# Patient Record
Sex: FEMALE | Race: WHITE | NOT HISPANIC OR LATINO
[De-identification: names, ages, dates, MRNs, and addresses within clinical notes are randomized per-mention and may not be internally consistent; named-entity substitution may affect disease eponyms.]

---

## 2017-02-15 ENCOUNTER — TRANSCRIPTION ENCOUNTER (OUTPATIENT)
Age: 67
End: 2017-02-15

## 2018-11-19 ENCOUNTER — MEDICATION RENEWAL (OUTPATIENT)
Age: 68
End: 2018-11-19

## 2019-01-13 ENCOUNTER — RX RENEWAL (OUTPATIENT)
Age: 69
End: 2019-01-13

## 2019-02-18 ENCOUNTER — RECORD ABSTRACTING (OUTPATIENT)
Age: 69
End: 2019-02-18

## 2019-02-18 DIAGNOSIS — M16.10 UNILATERAL PRIMARY OSTEOARTHRITIS, UNSPECIFIED HIP: ICD-10-CM

## 2019-02-18 DIAGNOSIS — L30.9 DERMATITIS, UNSPECIFIED: ICD-10-CM

## 2019-02-18 DIAGNOSIS — H81.10 BENIGN PAROXYSMAL VERTIGO, UNSPECIFIED EAR: ICD-10-CM

## 2019-02-18 DIAGNOSIS — R25.2 CRAMP AND SPASM: ICD-10-CM

## 2019-02-18 DIAGNOSIS — Z86.79 PERSONAL HISTORY OF OTHER DISEASES OF THE CIRCULATORY SYSTEM: ICD-10-CM

## 2019-02-18 DIAGNOSIS — Z82.49 FAMILY HISTORY OF ISCHEMIC HEART DISEASE AND OTHER DISEASES OF THE CIRCULATORY SYSTEM: ICD-10-CM

## 2019-02-18 DIAGNOSIS — Z87.898 PERSONAL HISTORY OF OTHER SPECIFIED CONDITIONS: ICD-10-CM

## 2019-02-18 DIAGNOSIS — Z83.438 FAMILY HISTORY OF OTHER DISORDER OF LIPOPROTEIN METABOLISM AND OTHER LIPIDEMIA: ICD-10-CM

## 2019-02-18 DIAGNOSIS — Z79.899 OTHER LONG TERM (CURRENT) DRUG THERAPY: ICD-10-CM

## 2019-02-18 RX ORDER — MULTIVIT,IRON,MINERALS/LUTEIN
TABLET ORAL DAILY
Refills: 0 | Status: ACTIVE | COMMUNITY

## 2019-02-18 RX ORDER — ASCORBIC ACID 500 MG
500 TABLET ORAL DAILY
Refills: 0 | Status: ACTIVE | COMMUNITY

## 2019-02-19 ENCOUNTER — APPOINTMENT (OUTPATIENT)
Dept: INTERNAL MEDICINE | Facility: CLINIC | Age: 69
End: 2019-02-19
Payer: MEDICARE

## 2019-02-19 ENCOUNTER — NON-APPOINTMENT (OUTPATIENT)
Age: 69
End: 2019-02-19

## 2019-02-19 VITALS
HEART RATE: 70 BPM | HEIGHT: 65 IN | BODY MASS INDEX: 29.66 KG/M2 | WEIGHT: 178 LBS | SYSTOLIC BLOOD PRESSURE: 143 MMHG | DIASTOLIC BLOOD PRESSURE: 82 MMHG

## 2019-02-19 VITALS — SYSTOLIC BLOOD PRESSURE: 134 MMHG | DIASTOLIC BLOOD PRESSURE: 82 MMHG

## 2019-02-19 DIAGNOSIS — Z78.9 OTHER SPECIFIED HEALTH STATUS: ICD-10-CM

## 2019-02-19 DIAGNOSIS — Z80.0 FAMILY HISTORY OF MALIGNANT NEOPLASM OF DIGESTIVE ORGANS: ICD-10-CM

## 2019-02-19 DIAGNOSIS — Z80.42 FAMILY HISTORY OF MALIGNANT NEOPLASM OF PROSTATE: ICD-10-CM

## 2019-02-19 DIAGNOSIS — E66.3 OVERWEIGHT: ICD-10-CM

## 2019-02-19 DIAGNOSIS — E66.9 OBESITY, UNSPECIFIED: ICD-10-CM

## 2019-02-19 LAB
BILIRUB UR QL STRIP: NEGATIVE
CLARITY UR: CLEAR
COLLECTION METHOD: NORMAL
GLUCOSE UR-MCNC: NEGATIVE
HCG UR QL: 0.2 EU/DL
HGB UR QL STRIP.AUTO: NEGATIVE
KETONES UR-MCNC: NEGATIVE
LEUKOCYTE ESTERASE UR QL STRIP: NEGATIVE
NITRITE UR QL STRIP: NEGATIVE
PH UR STRIP: 7
PROT UR STRIP-MCNC: NEGATIVE
SP GR UR STRIP: 1.01

## 2019-02-19 PROCEDURE — G0438: CPT

## 2019-02-19 PROCEDURE — 36415 COLL VENOUS BLD VENIPUNCTURE: CPT

## 2019-02-19 PROCEDURE — 81003 URINALYSIS AUTO W/O SCOPE: CPT | Mod: QW

## 2019-02-19 PROCEDURE — 93000 ELECTROCARDIOGRAM COMPLETE: CPT

## 2019-02-19 NOTE — HEALTH RISK ASSESSMENT
[Good] : ~his/her~  mood as  good [No falls in past year] : Patient reported no falls in the past year [0] : 2) Feeling down, depressed, or hopeless: Not at all (0) [Patient reported mammogram was normal] : Patient reported mammogram was normal [Patient reported PAP Smear was normal] : Patient reported PAP Smear was normal [Patient reported colonoscopy was normal] : Patient reported colonoscopy was normal [None] : None [With Family] : lives with family [] :  [Fully functional (bathing, dressing, toileting, transferring, walking, feeding)] : Fully functional (bathing, dressing, toileting, transferring, walking, feeding) [Fully functional (using the telephone, shopping, preparing meals, housekeeping, doing laundry, using] : Fully functional and needs no help or supervision to perform IADLs (using the telephone, shopping, preparing meals, housekeeping, doing laundry, using transportation, managing medications and managing finances) [With Patient/Caregiver] : With Patient/Caregiver [Designated Healthcare Proxy] : Designated healthcare proxy [I will adhere to the patient's wishes as expressed in the advance directive except as noted below.] : I will adhere to the patient's wishes as expressed in the advance directive except as noted below [] : No [[id number]] : 0 [Change in mental status noted] : No change in mental status noted [Reports changes in hearing] : Reports no changes in hearing [Reports changes in vision] : Reports no changes in vision [Reports changes in dental health] : Reports no changes in dental health [MammogramDate] : 07/18/2018 [PapSmearDate] : April 2018 [ColonoscopyDate] : 11/27/2018 [HepatitisCDate] : 02/02/2016 [AdvancecareDate] : 02/19/2019

## 2019-02-19 NOTE — HISTORY OF PRESENT ILLNESS
[FreeTextEntry1] : CPE [de-identified] : The patient is being seen for a health maintenance evaluation.\par Exercise: The patient is not exercising\par Diet: No formal diet at present, had been on Nutrasystem but stopped because of expense, bored with the food and didn't see further weight loss\par Dental: Has not had dental exam  for 5 years\par Hearing: normal moat of the time\par Vision. Glasses: full-time\par             Checks: October 2018\par Patient takes BP at home and has been in the range of 118-132/61-74\par

## 2019-02-19 NOTE — PHYSICAL EXAM
[No Acute Distress] : no acute distress [Well Nourished] : well nourished [Well Developed] : well developed [Well-Appearing] : well-appearing [Normal Sclera/Conjunctiva] : normal sclera/conjunctiva [PERRL] : pupils equal round and reactive to light [EOMI] : extraocular movements intact [Normal Outer Ear/Nose] : the outer ears and nose were normal in appearance [Normal Oropharynx] : the oropharynx was normal [Normal TMs] : both tympanic membranes were normal [No JVD] : no jugular venous distention [Supple] : supple [No Lymphadenopathy] : no lymphadenopathy [Thyroid Normal, No Nodules] : the thyroid was normal and there were no nodules present [No Respiratory Distress] : no respiratory distress  [Clear to Auscultation] : lungs were clear to auscultation bilaterally [No Accessory Muscle Use] : no accessory muscle use [Normal Percussion] : the chest was normal to percussion [Normal Rate] : normal rate  [Regular Rhythm] : with a regular rhythm [Normal S1, S2] : normal S1 and S2 [No Murmur] : no murmur heard [No Carotid Bruits] : no carotid bruits [No Abdominal Bruit] : a ~M bruit was not heard ~T in the abdomen [No Varicosities] : no varicosities [Pedal Pulses Present] : the pedal pulses are present [No Edema] : there was no peripheral edema [No Extremity Clubbing/Cyanosis] : no extremity clubbing/cyanosis [No Palpable Aorta] : no palpable aorta [Normal Appearance] : normal in appearance [No Nipple Discharge] : no nipple discharge [Soft] : abdomen soft [Non Tender] : non-tender [Non-distended] : non-distended [No Masses] : no abdominal mass palpated [No HSM] : no HSM [Normal Bowel Sounds] : normal bowel sounds [Normal Supraclavicular Nodes] : no supraclavicular lymphadenopathy [Normal Axillary Nodes] : no axillary lymphadenopathy [Normal Posterior Cervical Nodes] : no posterior cervical lymphadenopathy [Normal Anterior Cervical Nodes] : no anterior cervical lymphadenopathy [No CVA Tenderness] : no CVA  tenderness [No Spinal Tenderness] : no spinal tenderness [No Joint Swelling] : no joint swelling [Grossly Normal Strength/Tone] : grossly normal strength/tone [No Rash] : no rash [No Skin Lesions] : no skin lesions [Normal Gait] : normal gait [Coordination Grossly Intact] : coordination grossly intact [No Focal Deficits] : no focal deficits [Deep Tendon Reflexes (DTR)] : deep tendon reflexes were 2+ and symmetric [Speech Grossly Normal] : speech grossly normal [Memory Grossly Normal] : memory grossly normal [Normal Affect] : the affect was normal [Alert and Oriented x3] : oriented to person, place, and time [Normal Mood] : the mood was normal [Normal Insight/Judgement] : insight and judgment were intact [Kyphosis] : no kyphosis [de-identified] : teeth-plaque [de-identified] : bruise on left anterior thigh, resolving

## 2019-02-19 NOTE — REVIEW OF SYSTEMS
[Dyspnea on Exertion] : dyspnea on exertion [Nocturia] : nocturia [Easy Bruising] : easy bruising [Negative] : ENT [Fever] : no fever [Chills] : no chills [Fatigue] : no fatigue [Hot Flashes] : no hot flashes [Night Sweats] : no night sweats [Chest Pain] : no chest pain [Palpitations] : no palpitations [Leg Claudication] : no leg claudication [Lower Ext Edema] : no lower extremity edema [Orthopnea] : no orthopnea [Wheezing] : no wheezing [Cough] : no cough [Nausea] : no nausea [Constipation] : no constipation [Diarrhea] : diarrhea [Vomiting] : no vomiting [Incontinence] : no incontinence [Frequency] : no frequency [Joint Pain] : no joint pain [Joint Stiffness] : no joint stiffness [Muscle Pain] : no muscle pain [Back Pain] : no back pain [Itching] : no itching [Mole Changes] : no mole changes [Skin Rash] : no skin rash [Headache] : no headache [Dizziness] : no dizziness [Fainting] : no fainting [Memory Loss] : no memory loss [Insomnia] : no insomnia [Anxiety] : no anxiety [Depression] : no depression [Easy Bleeding] : no easy bleeding [Swollen Glands] : no swollen glands [FreeTextEntry6] : can walk 2 flights [FreeTextEntry8] : nocturia 1x/night [de-identified] : eczema on left heel

## 2019-02-20 LAB
ALBUMIN SERPL ELPH-MCNC: 4.7 G/DL
ALP BLD-CCNC: 88 U/L
ALT SERPL-CCNC: 25 U/L
ANION GAP SERPL CALC-SCNC: 13 MMOL/L
AST SERPL-CCNC: 17 U/L
BASOPHILS # BLD AUTO: 0.05 K/UL
BASOPHILS NFR BLD AUTO: 0.7 %
BILIRUB SERPL-MCNC: 0.5 MG/DL
BUN SERPL-MCNC: 18 MG/DL
CALCIUM SERPL-MCNC: 9.9 MG/DL
CHLORIDE SERPL-SCNC: 101 MMOL/L
CHOLEST SERPL-MCNC: 175 MG/DL
CHOLEST/HDLC SERPL: 2.5 RATIO
CK SERPL-CCNC: 78 U/L
CO2 SERPL-SCNC: 27 MMOL/L
CREAT SERPL-MCNC: 0.71 MG/DL
EOSINOPHIL # BLD AUTO: 0.25 K/UL
EOSINOPHIL NFR BLD AUTO: 3.4 %
GLUCOSE SERPL-MCNC: 86 MG/DL
HCT VFR BLD CALC: 49.3 %
HDLC SERPL-MCNC: 70 MG/DL
HGB BLD-MCNC: 15.7 G/DL
IMM GRANULOCYTES NFR BLD AUTO: 0.1 %
LDLC SERPL CALC-MCNC: 73 MG/DL
LYMPHOCYTES # BLD AUTO: 2.69 K/UL
LYMPHOCYTES NFR BLD AUTO: 37 %
MAN DIFF?: NORMAL
MCHC RBC-ENTMCNC: 29.6 PG
MCHC RBC-ENTMCNC: 31.8 GM/DL
MCV RBC AUTO: 92.8 FL
MONOCYTES # BLD AUTO: 0.59 K/UL
MONOCYTES NFR BLD AUTO: 8.1 %
NEUTROPHILS # BLD AUTO: 3.68 K/UL
NEUTROPHILS NFR BLD AUTO: 50.7 %
PLATELET # BLD AUTO: 249 K/UL
POTASSIUM SERPL-SCNC: 4.9 MMOL/L
PROT SERPL-MCNC: 7.1 G/DL
RBC # BLD: 5.31 M/UL
RBC # FLD: 13.4 %
SODIUM SERPL-SCNC: 141 MMOL/L
TRIGL SERPL-MCNC: 162 MG/DL
WBC # FLD AUTO: 7.27 K/UL

## 2019-03-03 ENCOUNTER — TRANSCRIPTION ENCOUNTER (OUTPATIENT)
Age: 69
End: 2019-03-03

## 2019-03-13 DIAGNOSIS — M81.0 AGE-RELATED OSTEOPOROSIS W/OUT CURRENT PATHOLOGICAL FRACTURE: ICD-10-CM

## 2019-04-18 ENCOUNTER — RX RENEWAL (OUTPATIENT)
Age: 69
End: 2019-04-18

## 2019-06-04 ENCOUNTER — APPOINTMENT (OUTPATIENT)
Dept: INTERNAL MEDICINE | Facility: CLINIC | Age: 69
End: 2019-06-04
Payer: MEDICARE

## 2019-06-04 VITALS — DIASTOLIC BLOOD PRESSURE: 76 MMHG | SYSTOLIC BLOOD PRESSURE: 132 MMHG

## 2019-06-04 VITALS
HEART RATE: 72 BPM | BODY MASS INDEX: 30.45 KG/M2 | WEIGHT: 183 LBS | DIASTOLIC BLOOD PRESSURE: 80 MMHG | SYSTOLIC BLOOD PRESSURE: 144 MMHG

## 2019-06-04 PROCEDURE — 99213 OFFICE O/P EST LOW 20 MIN: CPT

## 2019-06-04 NOTE — PLAN
[FreeTextEntry1] : Podiatry evaluation re othotic.]\par Advil for thumb and heel but only occasionally b/o problem of HTN.\par No change in meds.\par Blood work in late September and CPE again in Feb 2020.

## 2019-06-04 NOTE — PHYSICAL EXAM
[No Acute Distress] : no acute distress [No JVD] : no jugular venous distention [Supple] : supple [No Respiratory Distress] : no respiratory distress  [Clear to Auscultation] : lungs were clear to auscultation bilaterally [Normal Rate] : normal rate  [Regular Rhythm] : with a regular rhythm [Normal S1, S2] : normal S1 and S2 [Pedal Pulses Present] : the pedal pulses are present [No Edema] : there was no peripheral edema [de-identified] : obese

## 2019-06-04 NOTE — HISTORY OF PRESENT ILLNESS
[FreeTextEntry1] : follow up [de-identified] : Has checked BP at home a few times 2 out of 3 are very good and June 4 144/75, similar to office BP today. SOB , chest pain, pressure none. Palpitations none.\par Taking all meds regularly. No muscle pain dizziness or lightheadedness. \par Not on any diet. Pain in base of right  thumb. Not  related to weather Pain in right heel and arch at times. Has it with sneakers too. Has not seen podiatrist.

## 2019-07-03 ENCOUNTER — RX RENEWAL (OUTPATIENT)
Age: 69
End: 2019-07-03

## 2019-08-28 ENCOUNTER — RX RENEWAL (OUTPATIENT)
Age: 69
End: 2019-08-28

## 2019-09-23 ENCOUNTER — RX RENEWAL (OUTPATIENT)
Age: 69
End: 2019-09-23

## 2019-10-08 ENCOUNTER — APPOINTMENT (OUTPATIENT)
Dept: INTERNAL MEDICINE | Facility: CLINIC | Age: 69
End: 2019-10-08

## 2019-10-10 ENCOUNTER — APPOINTMENT (OUTPATIENT)
Dept: INTERNAL MEDICINE | Facility: CLINIC | Age: 69
End: 2019-10-10
Payer: MEDICARE

## 2019-10-10 VITALS
HEART RATE: 74 BPM | WEIGHT: 185 LBS | HEIGHT: 65 IN | SYSTOLIC BLOOD PRESSURE: 140 MMHG | BODY MASS INDEX: 30.82 KG/M2 | DIASTOLIC BLOOD PRESSURE: 84 MMHG

## 2019-10-10 VITALS — SYSTOLIC BLOOD PRESSURE: 140 MMHG | DIASTOLIC BLOOD PRESSURE: 78 MMHG

## 2019-10-10 PROCEDURE — G0008: CPT

## 2019-10-10 PROCEDURE — 99213 OFFICE O/P EST LOW 20 MIN: CPT | Mod: 25

## 2019-10-10 PROCEDURE — 90662 IIV NO PRSV INCREASED AG IM: CPT

## 2019-10-10 PROCEDURE — 36415 COLL VENOUS BLD VENIPUNCTURE: CPT

## 2019-10-10 RX ORDER — MULTIVIT-MIN/FOLIC/VIT K/LYCOP 400-300MCG
25 MCG TABLET ORAL DAILY
Qty: 90 | Refills: 0 | Status: ACTIVE | COMMUNITY

## 2019-10-10 NOTE — HISTORY OF PRESENT ILLNESS
[FreeTextEntry1] : follow up [de-identified] : Has checked BP at home several times and overall are very good: 118--126 systolic. SOB , chest pain, pressure, Palpitations none.\par Taking all meds regularly. No muscle pain, dizziness or lightheadedness. \par Not on any diet. Weight unchanged.\par Pain in base of right  thumb persists. Now suggests that it may be related to weather Pain in right heel and arch has disappeared. \par Appetite good. \par Exercise --trying to walk but only a few blocks.

## 2019-10-10 NOTE — PHYSICAL EXAM
[No Acute Distress] : no acute distress [No Lymphadenopathy] : no lymphadenopathy [No JVD] : no jugular venous distention [No Respiratory Distress] : no respiratory distress  [No Accessory Muscle Use] : no accessory muscle use [Clear to Auscultation] : lungs were clear to auscultation bilaterally [Normal Percussion] : the chest was normal to percussion [Normal Rate] : normal rate  [Normal S1, S2] : normal S1 and S2 [Regular Rhythm] : with a regular rhythm [No Edema] : there was no peripheral edema [de-identified] : obese

## 2019-10-11 LAB
25(OH)D3 SERPL-MCNC: 52.7 NG/ML
ALBUMIN SERPL ELPH-MCNC: 4.8 G/DL
ALP BLD-CCNC: 80 U/L
ALT SERPL-CCNC: 17 U/L
ANION GAP SERPL CALC-SCNC: 19 MMOL/L
AST SERPL-CCNC: 14 U/L
BILIRUB SERPL-MCNC: 0.4 MG/DL
BUN SERPL-MCNC: 17 MG/DL
CALCIUM SERPL-MCNC: 9.9 MG/DL
CHLORIDE SERPL-SCNC: 100 MMOL/L
CHOLEST SERPL-MCNC: 151 MG/DL
CHOLEST/HDLC SERPL: 2.8 RATIO
CK SERPL-CCNC: 85 U/L
CO2 SERPL-SCNC: 23 MMOL/L
CREAT SERPL-MCNC: 0.71 MG/DL
GLUCOSE SERPL-MCNC: 87 MG/DL
HDLC SERPL-MCNC: 55 MG/DL
LDLC SERPL CALC-MCNC: 58 MG/DL
POTASSIUM SERPL-SCNC: 3.7 MMOL/L
PROT SERPL-MCNC: 6.9 G/DL
SODIUM SERPL-SCNC: 142 MMOL/L
TRIGL SERPL-MCNC: 188 MG/DL

## 2019-10-23 ENCOUNTER — TRANSCRIPTION ENCOUNTER (OUTPATIENT)
Age: 69
End: 2019-10-23

## 2019-11-21 ENCOUNTER — RX RENEWAL (OUTPATIENT)
Age: 69
End: 2019-11-21

## 2019-12-10 ENCOUNTER — APPOINTMENT (OUTPATIENT)
Dept: INTERNAL MEDICINE | Facility: CLINIC | Age: 69
End: 2019-12-10
Payer: MEDICARE

## 2019-12-10 VITALS — SYSTOLIC BLOOD PRESSURE: 134 MMHG | DIASTOLIC BLOOD PRESSURE: 78 MMHG

## 2019-12-10 VITALS
DIASTOLIC BLOOD PRESSURE: 82 MMHG | HEART RATE: 70 BPM | SYSTOLIC BLOOD PRESSURE: 140 MMHG | BODY MASS INDEX: 29.99 KG/M2 | WEIGHT: 180 LBS | HEIGHT: 65 IN

## 2019-12-10 PROCEDURE — 99213 OFFICE O/P EST LOW 20 MIN: CPT

## 2019-12-10 NOTE — HISTORY OF PRESENT ILLNESS
[FreeTextEntry1] : follow up [de-identified] : Has checked BP at home several times and overall are very good: 118--126 systolic. SOB , chest pain, pressure, Palpitations none.\par Taking all meds regularly. No muscle pain, dizziness or lightheadedness. \par Not on any diet. Weight unchanged.\par Pain in base of right  thumb persists. Now suggests that it may be related to weather Pain in right heel and arch has disappeared. \par Appetite good. \par Exercise --trying to walk but only a few blocks.

## 2019-12-10 NOTE — PHYSICAL EXAM
[No Acute Distress] : no acute distress [Normal Voice/Communication] : normal voice/communication [No JVD] : no jugular venous distention [No Lymphadenopathy] : no lymphadenopathy [No Respiratory Distress] : no respiratory distress  [No Accessory Muscle Use] : no accessory muscle use [Regular Rhythm] : with a regular rhythm [Clear to Auscultation] : lungs were clear to auscultation bilaterally [Normal Rate] : normal rate  [Normal S1, S2] : normal S1 and S2 [No Murmur] : no murmur heard [Normal Mood] : the mood was normal [Speech Grossly Normal] : speech grossly normal

## 2019-12-11 ENCOUNTER — RX RENEWAL (OUTPATIENT)
Age: 69
End: 2019-12-11

## 2020-01-26 ENCOUNTER — RX RENEWAL (OUTPATIENT)
Age: 70
End: 2020-01-26

## 2020-02-05 ENCOUNTER — NON-APPOINTMENT (OUTPATIENT)
Age: 70
End: 2020-02-05

## 2020-02-05 ENCOUNTER — APPOINTMENT (OUTPATIENT)
Dept: INTERNAL MEDICINE | Facility: CLINIC | Age: 70
End: 2020-02-05
Payer: MEDICARE

## 2020-02-05 VITALS
WEIGHT: 180 LBS | DIASTOLIC BLOOD PRESSURE: 70 MMHG | HEIGHT: 65 IN | HEART RATE: 71 BPM | BODY MASS INDEX: 29.99 KG/M2 | SYSTOLIC BLOOD PRESSURE: 130 MMHG

## 2020-02-05 PROCEDURE — 99214 OFFICE O/P EST MOD 30 MIN: CPT | Mod: 25

## 2020-02-05 PROCEDURE — 93000 ELECTROCARDIOGRAM COMPLETE: CPT

## 2020-02-05 NOTE — REVIEW OF SYSTEMS
[Fatigue] : fatigue [Recent Change In Weight] : ~T recent weight change [Negative] : Heme/Lymph [Fever] : no fever [Hot Flashes] : no hot flashes [Chills] : no chills [Night Sweats] : no night sweats [Nausea] : no nausea [Constipation] : no constipation [Vomiting] : no vomiting [Diarrhea] : diarrhea [Joint Pain] : no joint pain [Muscle Pain] : no muscle pain [Itching] : no itching [Skin Rash] : no skin rash

## 2020-02-05 NOTE — CONSULT LETTER
[Dear  ___] : Dear  [unfilled], [Consult Closing:] : Thank you very much for allowing me to participate in the care of this patient.  If you have any questions, please do not hesitate to contact me. [Please see my note below.] : Please see my note below. [Consult Letter:] : I had the pleasure of evaluating your patient, [unfilled]. [FreeTextEntry3] : Miriam Myers MD [facility] [Sincerely,] : Sincerely,

## 2020-02-05 NOTE — ASSESSMENT
[No Further Testing Recommended] : no further testing recommended [Patient Optimized for Surgery] : Patient optimized for surgery [Continue anti-platelet treatment as is] : Continue current anti-platelet treatment

## 2020-02-05 NOTE — HISTORY OF PRESENT ILLNESS
[No Pertinent Pulmonary History] : no history of asthma, COPD, sleep apnea, or smoking [No Pertinent Cardiac History] : no history of aortic stenosis, atrial fibrillation, coronary artery disease, recent myocardial infarction, or implantable device/pacemaker [No Adverse Anesthesia Reaction] : no adverse anesthesia reaction in self or family member [Good (7-10 METs)] : Good (7-10 METs) [(Patient denies any chest pain, claudication, dyspnea on exertion, orthopnea, palpitations or syncope)] : Patient denies any chest pain, claudication, dyspnea on exertion, orthopnea, palpitations or syncope [Anti-Platelet Agents: _____] : Anti-Platelet Agents: [unfilled] [Chronic Anticoagulation] : no chronic anticoagulation [Chronic Kidney Disease] : no chronic kidney disease [FreeTextEntry1] : cataract extraction right eye with IOL [Diabetes] : no diabetes [FreeTextEntry3] : Dru Pittman MD [FreeTextEntry2] : Feb 19, 2020 [FreeTextEntry4] : Going for right cataract extraction. \par Otherwise feels OK.  [FreeTextEntry5] : Had catheter ablation of SVT  in 2012

## 2020-02-05 NOTE — PHYSICAL EXAM
[No Acute Distress] : no acute distress [No JVD] : no jugular venous distention [No Lymphadenopathy] : no lymphadenopathy [Supple] : supple [Thyroid Normal, No Nodules] : the thyroid was normal and there were no nodules present [No Respiratory Distress] : no respiratory distress  [No Accessory Muscle Use] : no accessory muscle use [Clear to Auscultation] : lungs were clear to auscultation bilaterally [Normal Percussion] : the chest was normal to percussion [Normal Rate] : normal rate  [Regular Rhythm] : with a regular rhythm [Normal S1, S2] : normal S1 and S2 [No Murmur] : no murmur heard [No Abdominal Bruit] : a ~M bruit was not heard ~T in the abdomen [No Carotid Bruits] : no carotid bruits [Non Tender] : non-tender [No Edema] : there was no peripheral edema [Soft] : abdomen soft [Non-distended] : non-distended [Normal Bowel Sounds] : normal bowel sounds [No HSM] : no HSM [No Rash] : no rash [Normal Supraclavicular Nodes] : no supraclavicular lymphadenopathy [Well Nourished] : well nourished [Normal Voice/Communication] : normal voice/communication [Normal Sclera/Conjunctiva] : normal sclera/conjunctiva [No Palpable Aorta] : no palpable aorta

## 2020-02-06 LAB
ALBUMIN SERPL ELPH-MCNC: 4.8 G/DL
ALP BLD-CCNC: 91 U/L
ALT SERPL-CCNC: 22 U/L
ANION GAP SERPL CALC-SCNC: 13 MMOL/L
AST SERPL-CCNC: 14 U/L
BASOPHILS # BLD AUTO: 0.04 K/UL
BASOPHILS NFR BLD AUTO: 0.5 %
BILIRUB SERPL-MCNC: 0.5 MG/DL
BUN SERPL-MCNC: 19 MG/DL
CALCIUM SERPL-MCNC: 10.1 MG/DL
CHLORIDE SERPL-SCNC: 99 MMOL/L
CHOLEST SERPL-MCNC: 168 MG/DL
CHOLEST/HDLC SERPL: 2.7 RATIO
CO2 SERPL-SCNC: 29 MMOL/L
CREAT SERPL-MCNC: 0.78 MG/DL
EOSINOPHIL # BLD AUTO: 0.25 K/UL
EOSINOPHIL NFR BLD AUTO: 3 %
GLUCOSE SERPL-MCNC: 86 MG/DL
HCT VFR BLD CALC: 48.6 %
HDLC SERPL-MCNC: 62 MG/DL
HGB BLD-MCNC: 15.7 G/DL
IMM GRANULOCYTES NFR BLD AUTO: 0.4 %
LDLC SERPL CALC-MCNC: 66 MG/DL
LYMPHOCYTES # BLD AUTO: 2.84 K/UL
LYMPHOCYTES NFR BLD AUTO: 34.2 %
MAN DIFF?: NORMAL
MCHC RBC-ENTMCNC: 29.9 PG
MCHC RBC-ENTMCNC: 32.3 GM/DL
MCV RBC AUTO: 92.6 FL
MONOCYTES # BLD AUTO: 0.67 K/UL
MONOCYTES NFR BLD AUTO: 8.1 %
NEUTROPHILS # BLD AUTO: 4.48 K/UL
NEUTROPHILS NFR BLD AUTO: 53.8 %
PLATELET # BLD AUTO: 263 K/UL
POTASSIUM SERPL-SCNC: 4.2 MMOL/L
PROT SERPL-MCNC: 7 G/DL
RBC # BLD: 5.25 M/UL
RBC # FLD: 13.7 %
SODIUM SERPL-SCNC: 142 MMOL/L
TRIGL SERPL-MCNC: 199 MG/DL
WBC # FLD AUTO: 8.31 K/UL

## 2020-02-24 ENCOUNTER — RX RENEWAL (OUTPATIENT)
Age: 70
End: 2020-02-24

## 2020-02-27 ENCOUNTER — APPOINTMENT (OUTPATIENT)
Dept: INTERNAL MEDICINE | Facility: CLINIC | Age: 70
End: 2020-02-27
Payer: MEDICARE

## 2020-02-27 VITALS
HEART RATE: 70 BPM | BODY MASS INDEX: 29.99 KG/M2 | WEIGHT: 180 LBS | SYSTOLIC BLOOD PRESSURE: 141 MMHG | HEIGHT: 65 IN | DIASTOLIC BLOOD PRESSURE: 80 MMHG

## 2020-02-27 VITALS — SYSTOLIC BLOOD PRESSURE: 126 MMHG | DIASTOLIC BLOOD PRESSURE: 76 MMHG

## 2020-02-27 DIAGNOSIS — H26.9 UNSPECIFIED CATARACT: ICD-10-CM

## 2020-02-27 PROCEDURE — G0442 ANNUAL ALCOHOL SCREEN 15 MIN: CPT | Mod: 59

## 2020-02-27 PROCEDURE — G0439: CPT

## 2020-02-27 NOTE — PHYSICAL EXAM
[No Acute Distress] : no acute distress [Well Nourished] : well nourished [Well Developed] : well developed [Normal Sclera/Conjunctiva] : normal sclera/conjunctiva [Well-Appearing] : well-appearing [Normal Voice/Communication] : normal voice/communication [EOMI] : extraocular movements intact [Normal Outer Ear/Nose] : the outer ears and nose were normal in appearance [Normal TMs] : both tympanic membranes were normal [Normal Oropharynx] : the oropharynx was normal [No JVD] : no jugular venous distention [Supple] : supple [No Lymphadenopathy] : no lymphadenopathy [No Accessory Muscle Use] : no accessory muscle use [No Respiratory Distress] : no respiratory distress  [Thyroid Normal, No Nodules] : the thyroid was normal and there were no nodules present [Normal Rate] : normal rate  [Regular Rhythm] : with a regular rhythm [Clear to Auscultation] : lungs were clear to auscultation bilaterally [Normal S1, S2] : normal S1 and S2 [No Abdominal Bruit] : a ~M bruit was not heard ~T in the abdomen [No Carotid Bruits] : no carotid bruits [No Murmur] : no murmur heard [No Varicosities] : no varicosities [No Edema] : there was no peripheral edema [Pedal Pulses Present] : the pedal pulses are present [No Palpable Aorta] : no palpable aorta [No Extremity Clubbing/Cyanosis] : no extremity clubbing/cyanosis [Normal Appearance] : normal in appearance [Soft] : abdomen soft [No Nipple Discharge] : no nipple discharge [No Axillary Lymphadenopathy] : no axillary lymphadenopathy [Non Tender] : non-tender [Non-distended] : non-distended [No HSM] : no HSM [No Masses] : no abdominal mass palpated [Normal Bowel Sounds] : normal bowel sounds [Normal Supraclavicular Nodes] : no supraclavicular lymphadenopathy [Normal Axillary Nodes] : no axillary lymphadenopathy [Normal Posterior Cervical Nodes] : no posterior cervical lymphadenopathy [Normal Femoral Nodes] : no femoral lymphadenopathy [Normal Anterior Cervical Nodes] : no anterior cervical lymphadenopathy [No CVA Tenderness] : no CVA  tenderness [No Spinal Tenderness] : no spinal tenderness [Grossly Normal Strength/Tone] : grossly normal strength/tone [No Joint Swelling] : no joint swelling [No Rash] : no rash [Coordination Grossly Intact] : coordination grossly intact [No Skin Lesions] : no skin lesions [Normal Gait] : normal gait [No Focal Deficits] : no focal deficits [Speech Grossly Normal] : speech grossly normal [Memory Grossly Normal] : memory grossly normal [Normal Affect] : the affect was normal [Alert and Oriented x3] : oriented to person, place, and time [Normal Mood] : the mood was normal [Normal Insight/Judgement] : insight and judgment were intact [Right Foot Was Examined] : Right foot ~C was examined [Left Foot Was Examined] : left foot ~C was examined [None] : no ulcers in either foot were found [] : with full ROM [Kyphosis] : no kyphosis [Scoliosis] : no scoliosis [Acne] : no acne [de-identified] : overweight [TWNoteComboBox1] : False [TWNoteComboBox2] : False [TWNoteComboBox4] : +2 [TWNoteComboBox3] : +2

## 2020-02-27 NOTE — HEALTH RISK ASSESSMENT
[Good] : ~his/her~ current health as good [Yes] : Yes [2 - 4 times a month (2 pts)] : 2-4 times a month (2 points) [1 or 2 (0 pts)] : 1 or 2 (0 points) [Never (0 pts)] : Never (0 points) [No] : In the past 12 months have you used drugs other than those required for medical reasons? No [No falls in past year] : Patient reported no falls in the past year [0] : 2) Feeling down, depressed, or hopeless: Not at all (0) [Patient reported mammogram was normal] : Patient reported mammogram was normal [Patient reported bone density results were normal] : Patient reported bone density results were normal [Patient reported PAP Smear was normal] : Patient reported PAP Smear was normal [HIV test declined] : HIV test declined [Patient reported colonoscopy was normal] : Patient reported colonoscopy was normal [With Significant Other] : lives with significant other [None] : None [Retired] : retired [High School] : high school [] :  [Sexually Active] : sexually active [Feels Safe at Home] : Feels safe at home [Fully functional (using the telephone, shopping, preparing meals, housekeeping, doing laundry, using] : Fully functional and needs no help or supervision to perform IADLs (using the telephone, shopping, preparing meals, housekeeping, doing laundry, using transportation, managing medications and managing finances) [Fully functional (bathing, dressing, toileting, transferring, walking, feeding)] : Fully functional (bathing, dressing, toileting, transferring, walking, feeding) [Designated Healthcare Proxy] : Designated healthcare proxy [With Patient/Caregiver] : With Patient/Caregiver [] : No [[id number]] : 0 [Audit-CScore] : 2 [Change in mental status noted] : No change in mental status noted [High Risk Behavior] : no high risk behavior [Reports changes in dental health] : Reports no changes in dental health [MammogramDate] : 07/29/2020 [PapSmearDate] : 04/2019 [BoneDensityDate] : 93/12/2019 [ColonoscopyDate] : 11/27/2018 [HepatitisCDate] : 02/01/2016 [HepatitisCComments] : negative [AdvancecareDate] : 02/27/2020

## 2020-02-27 NOTE — HISTORY OF PRESENT ILLNESS
[FreeTextEntry1] : CPE [de-identified] : The patient is being seen for a health maintenance evaluation.\par Exercise: The patient is not exercising\par Diet: No formal diet\par Dental: Has not had dental exam in a few years\par Hearing: normal \par Vision. Glasses: none\par             Checks: recently--had cataract surgery\par BPs at home  have been in the 117-133/ 65-71  range \par

## 2020-02-27 NOTE — REVIEW OF SYSTEMS
[Nocturia] : nocturia [Fever] : no fever [Chills] : no chills [Hot Flashes] : no hot flashes [Fatigue] : no fatigue [Night Sweats] : no night sweats [Discharge] : no discharge [Hoarseness] : no hoarseness [Nosebleed] : no nosebleeds [Earache] : no earache [Postnasal Drip] : no postnasal drip [Nasal Discharge] : no nasal discharge [Palpitations] : no palpitations [Chest Pain] : no chest pain [Leg Claudication] : no leg claudication [Lower Ext Edema] : no lower extremity edema [Shortness Of Breath] : no shortness of breath [Cough] : no cough [Wheezing] : no wheezing [Abdominal Pain] : no abdominal pain [Nausea] : no nausea [Constipation] : no constipation [Diarrhea] : diarrhea [Vomiting] : no vomiting [Heartburn] : no heartburn [Incontinence] : no incontinence [Joint Pain] : no joint pain [Frequency] : no frequency [Joint Stiffness] : no joint stiffness [Muscle Pain] : no muscle pain [Back Pain] : no back pain [Itching] : no itching [Skin Rash] : no skin rash [Headache] : no headache [Dizziness] : no dizziness [Insomnia] : no insomnia [Easy Bleeding] : no easy bleeding [Swollen Glands] : no swollen glands [Easy Bruising] : no easy bruising

## 2020-03-03 ENCOUNTER — TRANSCRIPTION ENCOUNTER (OUTPATIENT)
Age: 70
End: 2020-03-03

## 2020-05-12 ENCOUNTER — RX RENEWAL (OUTPATIENT)
Age: 70
End: 2020-05-12

## 2020-07-13 ENCOUNTER — RX RENEWAL (OUTPATIENT)
Age: 70
End: 2020-07-13

## 2020-08-03 ENCOUNTER — RX RENEWAL (OUTPATIENT)
Age: 70
End: 2020-08-03

## 2020-08-27 ENCOUNTER — APPOINTMENT (OUTPATIENT)
Dept: INTERNAL MEDICINE | Facility: CLINIC | Age: 70
End: 2020-08-27
Payer: MEDICARE

## 2020-08-27 VITALS
TEMPERATURE: 98.1 F | HEIGHT: 65 IN | BODY MASS INDEX: 31.65 KG/M2 | DIASTOLIC BLOOD PRESSURE: 84 MMHG | WEIGHT: 190 LBS | HEART RATE: 72 BPM | SYSTOLIC BLOOD PRESSURE: 148 MMHG

## 2020-08-27 VITALS — SYSTOLIC BLOOD PRESSURE: 130 MMHG | DIASTOLIC BLOOD PRESSURE: 78 MMHG

## 2020-08-27 LAB
25(OH)D3 SERPL-MCNC: 43 NG/ML
ALBUMIN SERPL ELPH-MCNC: 4.7 G/DL
ALP BLD-CCNC: 96 U/L
ALT SERPL-CCNC: 27 U/L
ANION GAP SERPL CALC-SCNC: 14 MMOL/L
AST SERPL-CCNC: 18 U/L
BILIRUB SERPL-MCNC: 0.5 MG/DL
BUN SERPL-MCNC: 21 MG/DL
CALCIUM SERPL-MCNC: 10.2 MG/DL
CHLORIDE SERPL-SCNC: 99 MMOL/L
CHOLEST SERPL-MCNC: 166 MG/DL
CHOLEST/HDLC SERPL: 2.8 RATIO
CK SERPL-CCNC: 88 U/L
CO2 SERPL-SCNC: 26 MMOL/L
CREAT SERPL-MCNC: 0.73 MG/DL
GLUCOSE SERPL-MCNC: 94 MG/DL
HDLC SERPL-MCNC: 59 MG/DL
LDLC SERPL CALC-MCNC: 71 MG/DL
POTASSIUM SERPL-SCNC: 3.5 MMOL/L
PROT SERPL-MCNC: 6.9 G/DL
SODIUM SERPL-SCNC: 139 MMOL/L
TRIGL SERPL-MCNC: 174 MG/DL

## 2020-08-27 PROCEDURE — 99213 OFFICE O/P EST LOW 20 MIN: CPT | Mod: 25

## 2020-08-27 PROCEDURE — 36415 COLL VENOUS BLD VENIPUNCTURE: CPT

## 2020-08-27 NOTE — PLAN
[FreeTextEntry1] : Flu shot in Sept\par Exercise emphasized, needs to lose the 10 lbs gained. Low carbs , walking weather permitting.

## 2020-08-27 NOTE — ASSESSMENT
[FreeTextEntry1] : BP OK.\par No COVID symptoms in the past ansd has kept distance--not going out much.

## 2020-08-27 NOTE — PHYSICAL EXAM
[No Acute Distress] : no acute distress [Normal Voice/Communication] : normal voice/communication [No Respiratory Distress] : no respiratory distress  [No Accessory Muscle Use] : no accessory muscle use [Clear to Auscultation] : lungs were clear to auscultation bilaterally [Normal Rate] : normal rate  [Regular Rhythm] : with a regular rhythm [Normal S1, S2] : normal S1 and S2 [No Murmur] : no murmur heard [No Edema] : there was no peripheral edema

## 2020-08-27 NOTE — HISTORY OF PRESENT ILLNESS
[FreeTextEntry1] : follow up [de-identified] : Feels OK. No muscle aches. Exercise--not much. Appetite OK. Gained 10 lbs. \par BP at home has been as low as 115 and high 135/60-71. \par Headache, lightheadedness, chest pain SOB--none.\par Edema--none. \par COVID antibody test was negative last month.

## 2020-09-30 ENCOUNTER — RX RENEWAL (OUTPATIENT)
Age: 70
End: 2020-09-30

## 2020-10-09 ENCOUNTER — RX RENEWAL (OUTPATIENT)
Age: 70
End: 2020-10-09

## 2020-12-01 ENCOUNTER — APPOINTMENT (OUTPATIENT)
Dept: INTERNAL MEDICINE | Facility: CLINIC | Age: 70
End: 2020-12-01
Payer: MEDICARE

## 2020-12-01 VITALS
BODY MASS INDEX: 30.82 KG/M2 | DIASTOLIC BLOOD PRESSURE: 80 MMHG | WEIGHT: 185 LBS | TEMPERATURE: 98.1 F | HEIGHT: 65 IN | SYSTOLIC BLOOD PRESSURE: 130 MMHG | HEART RATE: 70 BPM

## 2020-12-01 PROCEDURE — 99213 OFFICE O/P EST LOW 20 MIN: CPT

## 2020-12-01 NOTE — PHYSICAL EXAM
[No Acute Distress] : no acute distress [Well Nourished] : well nourished [Well Developed] : well developed [Normal Voice/Communication] : normal voice/communication [No JVD] : no jugular venous distention [No Respiratory Distress] : no respiratory distress  [Clear to Auscultation] : lungs were clear to auscultation bilaterally [Normal Percussion] : the chest was normal to percussion [Normal Rate] : normal rate  [Regular Rhythm] : with a regular rhythm [Normal S1, S2] : normal S1 and S2 [No Murmur] : no murmur heard [No Edema] : there was no peripheral edema

## 2020-12-01 NOTE — HISTORY OF PRESENT ILLNESS
[FreeTextEntry1] : follow up [de-identified] : No exercise, not on a diet. Checking BP at home occasionally-3 determinations since 11/10/20 all have been OK. Mammogram in September was benign. \par No dizziness , chest pain. GEts SOB going up stairs. No PND. Edema--none,

## 2020-12-01 NOTE — PLAN
[FreeTextEntry1] : F/U in 3 months CPE with blood work. No change in meds.\par Weight loss encouraged. \par Echo

## 2021-03-01 ENCOUNTER — APPOINTMENT (OUTPATIENT)
Dept: INTERNAL MEDICINE | Facility: CLINIC | Age: 71
End: 2021-03-01
Payer: MEDICARE

## 2021-03-01 ENCOUNTER — NON-APPOINTMENT (OUTPATIENT)
Age: 71
End: 2021-03-01

## 2021-03-01 VITALS
TEMPERATURE: 98.1 F | DIASTOLIC BLOOD PRESSURE: 80 MMHG | BODY MASS INDEX: 30.82 KG/M2 | WEIGHT: 185 LBS | HEIGHT: 65 IN | SYSTOLIC BLOOD PRESSURE: 131 MMHG | HEART RATE: 67 BPM

## 2021-03-01 LAB
ALBUMIN SERPL ELPH-MCNC: 4.5 G/DL
ALP BLD-CCNC: 97 U/L
ALT SERPL-CCNC: 24 U/L
ANION GAP SERPL CALC-SCNC: 10 MMOL/L
AST SERPL-CCNC: 16 U/L
BASOPHILS # BLD AUTO: 0.05 K/UL
BASOPHILS NFR BLD AUTO: 0.7 %
BILIRUB SERPL-MCNC: 0.5 MG/DL
BILIRUB UR QL STRIP: NEGATIVE
BUN SERPL-MCNC: 16 MG/DL
CALCIUM SERPL-MCNC: 9.4 MG/DL
CHLORIDE SERPL-SCNC: 98 MMOL/L
CHOLEST SERPL-MCNC: 162 MG/DL
CK SERPL-CCNC: 76 U/L
CLARITY UR: CLEAR
CO2 SERPL-SCNC: 30 MMOL/L
COLLECTION METHOD: NORMAL
CREAT SERPL-MCNC: 0.78 MG/DL
EOSINOPHIL # BLD AUTO: 0.23 K/UL
EOSINOPHIL NFR BLD AUTO: 3 %
GLUCOSE SERPL-MCNC: 88 MG/DL
GLUCOSE UR-MCNC: NEGATIVE
HCG UR QL: 0.2 EU/DL
HCT VFR BLD CALC: 47.8 %
HDLC SERPL-MCNC: 65 MG/DL
HGB BLD-MCNC: 16 G/DL
HGB UR QL STRIP.AUTO: NEGATIVE
IMM GRANULOCYTES NFR BLD AUTO: 0.4 %
KETONES UR-MCNC: NEGATIVE
LDLC SERPL CALC-MCNC: 59 MG/DL
LEUKOCYTE ESTERASE UR QL STRIP: NEGATIVE
LYMPHOCYTES # BLD AUTO: 2.17 K/UL
LYMPHOCYTES NFR BLD AUTO: 28.6 %
MAN DIFF?: NORMAL
MCHC RBC-ENTMCNC: 29.9 PG
MCHC RBC-ENTMCNC: 33.5 GM/DL
MCV RBC AUTO: 89.2 FL
MONOCYTES # BLD AUTO: 0.59 K/UL
MONOCYTES NFR BLD AUTO: 7.8 %
NEUTROPHILS # BLD AUTO: 4.51 K/UL
NEUTROPHILS NFR BLD AUTO: 59.5 %
NITRITE UR QL STRIP: NEGATIVE
NONHDLC SERPL-MCNC: 97 MG/DL
PH UR STRIP: 7
PLATELET # BLD AUTO: 250 K/UL
POTASSIUM SERPL-SCNC: 3.7 MMOL/L
PROT SERPL-MCNC: 7 G/DL
PROT UR STRIP-MCNC: NEGATIVE
RBC # BLD: 5.36 M/UL
RBC # FLD: 13.7 %
SODIUM SERPL-SCNC: 138 MMOL/L
SP GR UR STRIP: 1.01
T4 FREE SERPL-MCNC: 1.6 NG/DL
TRIGL SERPL-MCNC: 188 MG/DL
TSH SERPL-ACNC: 1.34 UIU/ML
WBC # FLD AUTO: 7.58 K/UL

## 2021-03-01 PROCEDURE — 93306 TTE W/DOPPLER COMPLETE: CPT

## 2021-03-01 PROCEDURE — 36415 COLL VENOUS BLD VENIPUNCTURE: CPT

## 2021-03-01 PROCEDURE — G0439: CPT

## 2021-03-01 PROCEDURE — 93000 ELECTROCARDIOGRAM COMPLETE: CPT

## 2021-03-01 PROCEDURE — 81003 URINALYSIS AUTO W/O SCOPE: CPT | Mod: QW

## 2021-03-01 RX ORDER — ATORVASTATIN CALCIUM 10 MG/1
10 TABLET, FILM COATED ORAL
Qty: 90 | Refills: 3 | Status: DISCONTINUED | COMMUNITY
Start: 2019-07-03 | End: 2021-03-01

## 2021-03-01 NOTE — HISTORY OF PRESENT ILLNESS
[FreeTextEntry1] : CPE [de-identified] : The patient is being seen for a health maintenance evaluation.\par Exercise: The patient is not exercising\par Diet: No formal diet\par Dental: Has not had dental exam in a few years\par Hearing: normal \par Vision. Glasses: reading\par             Checks: December 2020\par BPs at home  have been in the 119-134/62-73  range but only checks monthly\par

## 2021-03-01 NOTE — COUNSELING
[Potential consequences of obesity discussed] : Potential consequences of obesity discussed [Benefits of weight loss discussed] : Benefits of weight loss discussed [Target Wt Loss Goal ___] : Weight Loss Goals: Target weight loss goal [unfilled] lbs [Weigh Self Weekly] : weigh self weekly [Decrease Portions] : decrease portions

## 2021-03-01 NOTE — RESULTS/DATA
[] : results reviewed [de-identified] : RSR borderline first degree A-V block [ECG Reviewed] : reviewed [Normal] : The 12 - lead ECG is normal [NSR] : normal sinus rhythm [P Waves Normal] : the P wave is normal [ECG Intervals [place].] : [place] interval is normal [Normal QRS] : the QRS is normal [ECG Axis] : the QRS axis is normal [Normal ST Segments] : the ST segments are normal [ECG T. Waves] : normal [No Interval Change] : no interval change

## 2021-03-01 NOTE — PHYSICAL EXAM
[No Acute Distress] : no acute distress [Well Nourished] : well nourished [Well Developed] : well developed [Well-Appearing] : well-appearing [Normal Voice/Communication] : normal voice/communication [Normal Sclera/Conjunctiva] : normal sclera/conjunctiva [EOMI] : extraocular movements intact [Normal Outer Ear/Nose] : the outer ears and nose were normal in appearance [Normal TMs] : both tympanic membranes were normal [No JVD] : no jugular venous distention [No Lymphadenopathy] : no lymphadenopathy [Supple] : supple [Thyroid Normal, No Nodules] : the thyroid was normal and there were no nodules present [No Respiratory Distress] : no respiratory distress  [No Accessory Muscle Use] : no accessory muscle use [Clear to Auscultation] : lungs were clear to auscultation bilaterally [Normal Percussion] : the chest was normal to percussion [Normal Rate] : normal rate  [Regular Rhythm] : with a regular rhythm [Normal S1, S2] : normal S1 and S2 [No Murmur] : no murmur heard [No Carotid Bruits] : no carotid bruits [No Abdominal Bruit] : a ~M bruit was not heard ~T in the abdomen [No Varicosities] : no varicosities [Pedal Pulses Present] : the pedal pulses are present [No Edema] : there was no peripheral edema [No Palpable Aorta] : no palpable aorta [No Extremity Clubbing/Cyanosis] : no extremity clubbing/cyanosis [Normal Appearance] : normal in appearance [No Nipple Discharge] : no nipple discharge [No Axillary Lymphadenopathy] : no axillary lymphadenopathy [Soft] : abdomen soft [Non Tender] : non-tender [Non-distended] : non-distended [No Masses] : no abdominal mass palpated [No HSM] : no HSM [Normal Bowel Sounds] : normal bowel sounds [Normal Supraclavicular Nodes] : no supraclavicular lymphadenopathy [Normal Axillary Nodes] : no axillary lymphadenopathy [Normal Posterior Cervical Nodes] : no posterior cervical lymphadenopathy [Normal Anterior Cervical Nodes] : no anterior cervical lymphadenopathy [Normal Femoral Nodes] : no femoral lymphadenopathy [No CVA Tenderness] : no CVA  tenderness [No Spinal Tenderness] : no spinal tenderness [No Joint Swelling] : no joint swelling [Grossly Normal Strength/Tone] : grossly normal strength/tone [No Rash] : no rash [No Skin Lesions] : no skin lesions [Coordination Grossly Intact] : coordination grossly intact [No Focal Deficits] : no focal deficits [Normal Gait] : normal gait [Speech Grossly Normal] : speech grossly normal [Memory Grossly Normal] : memory grossly normal [Normal Affect] : the affect was normal [Alert and Oriented x3] : oriented to person, place, and time [Normal Mood] : the mood was normal [Normal Insight/Judgement] : insight and judgment were intact [Right Foot Was Examined] : Right foot ~C was examined [Left Foot Was Examined] : left foot ~C was examined [None] : no ulcers in either foot were found [] : with full ROM [Kyphosis] : no kyphosis [Scoliosis] : no scoliosis [Acne] : no acne [de-identified] : overweight [TWNoteComboBox3] : +2 [TWNoteComboBox4] : +2

## 2021-03-01 NOTE — ASSESSMENT
[Patient Optimized for Surgery] : Patient optimized for surgery [No Further Testing Recommended] : no further testing recommended [Continue anti-platelet treatment as is] : Continue current anti-platelet treatment [FreeTextEntry1] : Overall doing OK

## 2021-03-01 NOTE — PLAN
[FreeTextEntry1] : Blood work\par Discussed weight and dietary changes.\par Increase exercise --walking

## 2021-03-01 NOTE — REVIEW OF SYSTEMS
[Lower Ext Edema] : lower extremity edema [Dyspnea on Exertion] : dyspnea on exertion [Nocturia] : nocturia [Joint Pain] : joint pain [Muscle Pain] : muscle pain [Skin Rash] : skin rash [Negative] : Heme/Lymph [Fever] : no fever [Chills] : no chills [Fatigue] : no fatigue [Hot Flashes] : no hot flashes [Night Sweats] : no night sweats [Recent Change In Weight] : ~T no recent weight change [Discharge] : no discharge [Redness] : no redness [Dryness] : no dryness  [Itching] : no itching [Earache] : no earache [Nosebleed] : no nosebleeds [Hoarseness] : no hoarseness [Nasal Discharge] : no nasal discharge [Postnasal Drip] : no postnasal drip [Chest Pain] : no chest pain [Palpitations] : no palpitations [Leg Claudication] : no leg claudication [Shortness Of Breath] : no shortness of breath [Wheezing] : no wheezing [Cough] : no cough [Abdominal Pain] : no abdominal pain [Nausea] : no nausea [Constipation] : no constipation [Diarrhea] : diarrhea [Vomiting] : no vomiting [Heartburn] : no heartburn [Incontinence] : no incontinence [Frequency] : no frequency [Joint Stiffness] : no joint stiffness [Back Pain] : no back pain [Headache] : no headache [Dizziness] : no dizziness [Fainting] : no fainting [Insomnia] : no insomnia [Anxiety] : no anxiety [Easy Bleeding] : no easy bleeding [Easy Bruising] : no easy bruising [Swollen Glands] : no swollen glands [FreeTextEntry5] : left ankle swells sometimes worse at night [FreeTextEntry6] : climbing up the stairs [de-identified] : eczema rash

## 2021-03-01 NOTE — CONSULT LETTER
[Dear  ___] : Dear  [unfilled], [Consult Letter:] : I had the pleasure of evaluating your patient, [unfilled]. [Please see my note below.] : Please see my note below. [Consult Closing:] : Thank you very much for allowing me to participate in the care of this patient.  If you have any questions, please do not hesitate to contact me. [Sincerely,] : Sincerely, [FreeTextEntry3] : Miriam Myers MD [facility]

## 2021-03-03 ENCOUNTER — TRANSCRIPTION ENCOUNTER (OUTPATIENT)
Age: 71
End: 2021-03-03

## 2021-04-27 ENCOUNTER — NON-APPOINTMENT (OUTPATIENT)
Age: 71
End: 2021-04-27

## 2021-05-04 ENCOUNTER — NON-APPOINTMENT (OUTPATIENT)
Age: 71
End: 2021-05-04

## 2021-06-01 ENCOUNTER — APPOINTMENT (OUTPATIENT)
Dept: INTERNAL MEDICINE | Facility: CLINIC | Age: 71
End: 2021-06-01
Payer: MEDICARE

## 2021-06-01 VITALS — DIASTOLIC BLOOD PRESSURE: 84 MMHG | SYSTOLIC BLOOD PRESSURE: 140 MMHG

## 2021-06-01 VITALS
BODY MASS INDEX: 30.79 KG/M2 | HEART RATE: 76 BPM | SYSTOLIC BLOOD PRESSURE: 153 MMHG | WEIGHT: 185 LBS | DIASTOLIC BLOOD PRESSURE: 75 MMHG

## 2021-06-01 DIAGNOSIS — M43.6 TORTICOLLIS: ICD-10-CM

## 2021-06-01 PROCEDURE — 99213 OFFICE O/P EST LOW 20 MIN: CPT

## 2021-06-01 NOTE — PHYSICAL EXAM
[No Acute Distress] : no acute distress [Well Nourished] : well nourished [Well Developed] : well developed [Well-Appearing] : well-appearing [Normal Voice/Communication] : normal voice/communication [No Respiratory Distress] : no respiratory distress  [No Accessory Muscle Use] : no accessory muscle use [Clear to Auscultation] : lungs were clear to auscultation bilaterally [Normal Percussion] : the chest was normal to percussion [Normal Rate] : normal rate  [Regular Rhythm] : with a regular rhythm [Normal S1, S2] : normal S1 and S2 [No Murmur] : no murmur heard [No Edema] : there was no peripheral edema

## 2021-06-01 NOTE — ASSESSMENT
[FreeTextEntry1] : Stable, BP elevated not fully compliant over the last few dayswith sodium restriction.\par

## 2021-06-01 NOTE — HISTORY OF PRESENT ILLNESS
[de-identified] : Muscle relaxants helped ease the shoulder /neck pain which has "eased out". Feels OK at present. \par No chest pain  or SOB. Palpitations--none. Edema--occasionally , left  more so and she feels it's from the Achilles tendon. Able to walk. Different areas of the ankles and is intermittent--can be fine for weeks, not related to the weather. \par BP at home today was 140/76, checks occasionally. Had some salt over the last Memorial weekend.

## 2021-06-01 NOTE — PLAN
[FreeTextEntry1] : Blood work in 3 months.\par Measure BP at home. \par No change in regimen. \par Salt control. \par Weight loss reinforced.

## 2021-06-02 PROBLEM — M43.6 STIFF NECK: Status: ACTIVE | Noted: 2021-05-06

## 2021-09-02 ENCOUNTER — APPOINTMENT (OUTPATIENT)
Dept: INTERNAL MEDICINE | Facility: CLINIC | Age: 71
End: 2021-09-02
Payer: MEDICARE

## 2021-09-02 VITALS
DIASTOLIC BLOOD PRESSURE: 80 MMHG | OXYGEN SATURATION: 97 % | BODY MASS INDEX: 31.32 KG/M2 | WEIGHT: 188 LBS | HEART RATE: 71 BPM | TEMPERATURE: 98.6 F | HEIGHT: 65 IN | SYSTOLIC BLOOD PRESSURE: 154 MMHG

## 2021-09-02 VITALS — RESPIRATION RATE: 18 BRPM | SYSTOLIC BLOOD PRESSURE: 134 MMHG | DIASTOLIC BLOOD PRESSURE: 62 MMHG

## 2021-09-02 DIAGNOSIS — Z23 ENCOUNTER FOR IMMUNIZATION: ICD-10-CM

## 2021-09-02 PROCEDURE — 99214 OFFICE O/P EST MOD 30 MIN: CPT | Mod: 25

## 2021-09-02 PROCEDURE — 90662 IIV NO PRSV INCREASED AG IM: CPT

## 2021-09-02 PROCEDURE — G0008: CPT

## 2021-09-02 PROCEDURE — 36415 COLL VENOUS BLD VENIPUNCTURE: CPT

## 2021-09-02 RX ORDER — CYCLOBENZAPRINE HYDROCHLORIDE 5 MG/1
5 TABLET, FILM COATED ORAL
Qty: 15 | Refills: 2 | Status: COMPLETED | COMMUNITY
Start: 2021-05-06 | End: 2021-09-01

## 2021-09-02 RX ORDER — MULTIVITAMIN/IRON/FOLIC ACID 18MG-0.4MG
600-400 TABLET ORAL
Qty: 180 | Refills: 3 | Status: COMPLETED | COMMUNITY
Start: 2019-02-19 | End: 2021-09-01

## 2021-09-02 NOTE — COUNSELING
[Potential consequences of obesity discussed] : Potential consequences of obesity discussed [Target Wt Loss Goal ___] : Weight Loss Goals: Target weight loss goal [unfilled] lbs [None] : None [Good understanding] : Patient has a good understanding of lifestyle changes and steps needed to achieve self management goal

## 2021-09-02 NOTE — HISTORY OF PRESENT ILLNESS
[FreeTextEntry1] : Follow up for HTN  [de-identified] : Patient presents for follow up for her chronic medical conditions. She reports compliance with all prescribed medical therapy and dietary restrictions. No complaints at present. \par AVerage BP at home are in the 120/70s.

## 2021-09-02 NOTE — ASSESSMENT
[FreeTextEntry1] : 71 y.o. woman with multiple medical comorbidities now with mildly elevated blood pressure

## 2021-09-02 NOTE — PHYSICAL EXAM
[No Acute Distress] : no acute distress [Well Nourished] : well nourished [Well Developed] : well developed [Well-Appearing] : well-appearing [No Respiratory Distress] : no respiratory distress  [No Accessory Muscle Use] : no accessory muscle use [Clear to Auscultation] : lungs were clear to auscultation bilaterally [Normal Rate] : normal rate  [Regular Rhythm] : with a regular rhythm [Normal S1, S2] : normal S1 and S2 [No Murmur] : no murmur heard [Soft] : abdomen soft [Non Tender] : non-tender [Non-distended] : non-distended [No Masses] : no abdominal mass palpated [Normal Bowel Sounds] : normal bowel sounds [Speech Grossly Normal] : speech grossly normal [Normal Affect] : the affect was normal [Memory Grossly Normal] : memory grossly normal [Alert and Oriented x3] : oriented to person, place, and time [Normal Mood] : the mood was normal [Normal Insight/Judgement] : insight and judgment were intact [de-identified] : Obese [de-identified] : Patient wearing protective face mask

## 2021-09-02 NOTE — HEALTH RISK ASSESSMENT
[0] : 2) Feeling down, depressed, or hopeless: Not at all (0) [PHQ-2 Negative - No further assessment needed] : PHQ-2 Negative - No further assessment needed [[id number]] : 0

## 2021-09-13 LAB
ALBUMIN SERPL ELPH-MCNC: 4.7 G/DL
ALP BLD-CCNC: 100 U/L
ALT SERPL-CCNC: 22 U/L
ANION GAP SERPL CALC-SCNC: 14 MMOL/L
AST SERPL-CCNC: 18 U/L
BILIRUB SERPL-MCNC: 0.4 MG/DL
BUN SERPL-MCNC: 19 MG/DL
CALCIUM SERPL-MCNC: 9.7 MG/DL
CHLORIDE SERPL-SCNC: 99 MMOL/L
CHOLEST SERPL-MCNC: 169 MG/DL
CK SERPL-CCNC: 74 U/L
CO2 SERPL-SCNC: 27 MMOL/L
CREAT SERPL-MCNC: 0.72 MG/DL
GLUCOSE SERPL-MCNC: 94 MG/DL
HDLC SERPL-MCNC: 59 MG/DL
LDLC SERPL CALC-MCNC: 67 MG/DL
MAGNESIUM SERPL-MCNC: 1.8 MG/DL
NONHDLC SERPL-MCNC: 110 MG/DL
POTASSIUM SERPL-SCNC: 3.4 MMOL/L
PROT SERPL-MCNC: 7.1 G/DL
SODIUM SERPL-SCNC: 140 MMOL/L
TRIGL SERPL-MCNC: 213 MG/DL

## 2021-11-18 ENCOUNTER — APPOINTMENT (OUTPATIENT)
Dept: INTERNAL MEDICINE | Facility: CLINIC | Age: 71
End: 2021-11-18
Payer: MEDICARE

## 2021-11-18 VITALS — DIASTOLIC BLOOD PRESSURE: 68 MMHG | SYSTOLIC BLOOD PRESSURE: 130 MMHG | RESPIRATION RATE: 16 BRPM

## 2021-11-18 VITALS
SYSTOLIC BLOOD PRESSURE: 143 MMHG | HEIGHT: 65 IN | HEART RATE: 92 BPM | DIASTOLIC BLOOD PRESSURE: 79 MMHG | BODY MASS INDEX: 31.32 KG/M2 | WEIGHT: 188 LBS

## 2021-11-18 DIAGNOSIS — S82.892G OTHER FRACTURE OF LEFT LOWER LEG, SUBSEQUENT ENCOUNTER FOR CLOSED FRACTURE WITH DELAYED HEALING: ICD-10-CM

## 2021-11-18 PROCEDURE — 99215 OFFICE O/P EST HI 40 MIN: CPT

## 2021-11-18 RX ORDER — SULFAMETHOXAZOLE AND TRIMETHOPRIM 800; 160 MG/1; MG/1
800-160 TABLET ORAL
Qty: 6 | Refills: 0 | Status: COMPLETED | COMMUNITY
Start: 2021-11-18 | End: 2021-11-21

## 2021-11-18 NOTE — PHYSICAL EXAM
[No Acute Distress] : no acute distress [Well Nourished] : well nourished [Well Developed] : well developed [Well-Appearing] : well-appearing [No Respiratory Distress] : no respiratory distress  [No Accessory Muscle Use] : no accessory muscle use [Clear to Auscultation] : lungs were clear to auscultation bilaterally [Normal Rate] : normal rate  [Regular Rhythm] : with a regular rhythm [Normal S1, S2] : normal S1 and S2 [No Murmur] : no murmur heard [Soft] : abdomen soft [Non Tender] : non-tender [Non-distended] : non-distended [No Masses] : no abdominal mass palpated [Normal Bowel Sounds] : normal bowel sounds [Speech Grossly Normal] : speech grossly normal [Memory Grossly Normal] : memory grossly normal [Normal Affect] : the affect was normal [Alert and Oriented x3] : oriented to person, place, and time [Normal Mood] : the mood was normal [Normal Insight/Judgement] : insight and judgment were intact [de-identified] : Obese [de-identified] : Patient wearing protective face mask  [de-identified] : Left lower extremity external fixation in place, covered with dressing.  [de-identified] : 1+ b/l  LE pitting edema

## 2021-11-18 NOTE — HISTORY OF PRESENT ILLNESS
[No Pertinent Cardiac History] : no history of aortic stenosis, atrial fibrillation, coronary artery disease, recent myocardial infarction, or implantable device/pacemaker [No Pertinent Pulmonary History] : no history of asthma, COPD, sleep apnea, or smoking [No Adverse Anesthesia Reaction] : no adverse anesthesia reaction in self or family member [Moderate (4-6 METs)] : Moderate (4-6 METs) [FreeTextEntry1] : Left ankle ORIF [FreeTextEntry2] : 11/23/2021 [FreeTextEntry3] : Dr. Evans [FreeTextEntry4] : Patient presents for pre-op evaluation for left femur ORIF. s/p mechanical fall. No complaints at present.

## 2021-11-26 ENCOUNTER — NON-APPOINTMENT (OUTPATIENT)
Age: 71
End: 2021-11-26

## 2021-12-06 ENCOUNTER — APPOINTMENT (OUTPATIENT)
Dept: INTERNAL MEDICINE | Facility: CLINIC | Age: 71
End: 2021-12-06
Payer: MEDICARE

## 2021-12-06 DIAGNOSIS — A49.9 URINARY TRACT INFECTION, SITE NOT SPECIFIED: ICD-10-CM

## 2021-12-06 DIAGNOSIS — Z01.818 ENCOUNTER FOR OTHER PREPROCEDURAL EXAMINATION: ICD-10-CM

## 2021-12-06 DIAGNOSIS — N39.0 URINARY TRACT INFECTION, SITE NOT SPECIFIED: ICD-10-CM

## 2021-12-06 PROCEDURE — 99213 OFFICE O/P EST LOW 20 MIN: CPT | Mod: 95

## 2021-12-06 RX ORDER — OMEGA-3/DHA/EPA/FISH OIL 1200 MG
1200 CAPSULE ORAL DAILY
Refills: 0 | Status: DISCONTINUED | COMMUNITY
End: 2021-12-06

## 2021-12-06 NOTE — HISTORY OF PRESENT ILLNESS
[Home] : at home, [unfilled] , at the time of the visit. [Medical Office: ([facility])___] : at the medical office located in  [Verbal consent obtained from patient] : the patient, [unfilled] [FreeTextEntry1] : Follow up for left ankle ORIF [de-identified] : She had left ankle ORIF on 11/23/2021. She reports that her pain is currently well controlled. Fish oil was discontinued post op. Patient does not monitor her BP at home. Ferrous sulfate was added to her regimen.

## 2022-04-04 ENCOUNTER — APPOINTMENT (OUTPATIENT)
Dept: INTERNAL MEDICINE | Facility: CLINIC | Age: 72
End: 2022-04-04
Payer: MEDICARE

## 2022-04-04 ENCOUNTER — NON-APPOINTMENT (OUTPATIENT)
Age: 72
End: 2022-04-04

## 2022-04-04 VITALS — DIASTOLIC BLOOD PRESSURE: 82 MMHG | RESPIRATION RATE: 16 BRPM | SYSTOLIC BLOOD PRESSURE: 146 MMHG

## 2022-04-04 VITALS
HEART RATE: 83 BPM | HEIGHT: 65 IN | BODY MASS INDEX: 31.16 KG/M2 | DIASTOLIC BLOOD PRESSURE: 70 MMHG | WEIGHT: 187 LBS | TEMPERATURE: 97.5 F | SYSTOLIC BLOOD PRESSURE: 160 MMHG | OXYGEN SATURATION: 98 %

## 2022-04-04 DIAGNOSIS — S82.892A OTHER FRACTURE OF LEFT LOWER LEG, INITIAL ENCOUNTER FOR CLOSED FRACTURE: ICD-10-CM

## 2022-04-04 PROCEDURE — 36415 COLL VENOUS BLD VENIPUNCTURE: CPT

## 2022-04-04 PROCEDURE — G0439: CPT

## 2022-04-04 PROCEDURE — 93000 ELECTROCARDIOGRAM COMPLETE: CPT

## 2022-04-04 RX ORDER — CHLORHEXIDINE GLUCONATE 4 %
325 (65 FE) LIQUID (ML) TOPICAL
Refills: 0 | Status: DISCONTINUED | COMMUNITY
End: 2022-04-03

## 2022-04-04 RX ORDER — FAMOTIDINE 20 MG/1
20 TABLET, FILM COATED ORAL
Qty: 28 | Refills: 0 | Status: COMPLETED | COMMUNITY
Start: 2021-11-24

## 2022-04-04 RX ORDER — DOCUSATE SODIUM 100 MG/1
100 CAPSULE, LIQUID FILLED ORAL
Qty: 60 | Refills: 0 | Status: COMPLETED | COMMUNITY
Start: 2021-11-09

## 2022-04-04 RX ORDER — OXYCODONE 5 MG/1
5 TABLET ORAL
Qty: 45 | Refills: 0 | Status: COMPLETED | COMMUNITY
Start: 2021-11-24

## 2022-04-04 RX ORDER — OXYCODONE AND ACETAMINOPHEN 5; 325 MG/1; MG/1
5-325 TABLET ORAL
Qty: 30 | Refills: 0 | Status: COMPLETED | COMMUNITY
Start: 2021-11-09

## 2022-04-04 RX ORDER — ASPIRIN 81 MG/1
81 TABLET ORAL DAILY
Refills: 0 | Status: DISCONTINUED | COMMUNITY
End: 2022-04-03

## 2022-04-04 RX ORDER — FERROUS SULFATE TAB EC 324 MG (65 MG FE EQUIVALENT) 324 (65 FE) MG
324 (65 FE) TABLET DELAYED RESPONSE ORAL
Qty: 60 | Refills: 0 | Status: COMPLETED | COMMUNITY
Start: 2021-11-24

## 2022-04-04 NOTE — HISTORY OF PRESENT ILLNESS
[FreeTextEntry1] : Patient presents for CPE and follow up for chronic medical conditions.  [de-identified] : Patient presents for CPE and follow up for chronic medical conditions. She reports compliance with all prescribed medical therapy and dietary restrictions. She reports that her blood pressure has been elevated at home with average BP readings in the 130s/70s. \par Today in office, BP with home machine is 156/86

## 2022-04-04 NOTE — PHYSICAL EXAM
[No Acute Distress] : no acute distress [Well Nourished] : well nourished [Well Developed] : well developed [Well-Appearing] : well-appearing [Normal Sclera/Conjunctiva] : normal sclera/conjunctiva [PERRL] : pupils equal round and reactive to light [EOMI] : extraocular movements intact [Normal Outer Ear/Nose] : the outer ears and nose were normal in appearance [Normal Oropharynx] : the oropharynx was normal [Normal TMs] : both tympanic membranes were normal [No JVD] : no jugular venous distention [No Lymphadenopathy] : no lymphadenopathy [Supple] : supple [No Respiratory Distress] : no respiratory distress  [No Accessory Muscle Use] : no accessory muscle use [Clear to Auscultation] : lungs were clear to auscultation bilaterally [Normal Rate] : normal rate  [Regular Rhythm] : with a regular rhythm [Normal S1, S2] : normal S1 and S2 [No Murmur] : no murmur heard [No Carotid Bruits] : no carotid bruits [No Abdominal Bruit] : a ~M bruit was not heard ~T in the abdomen [No Varicosities] : no varicosities [No Palpable Aorta] : no palpable aorta [No Extremity Clubbing/Cyanosis] : no extremity clubbing/cyanosis [Soft] : abdomen soft [Non Tender] : non-tender [Non-distended] : non-distended [No Masses] : no abdominal mass palpated [No HSM] : no HSM [Normal Bowel Sounds] : normal bowel sounds [Normal Posterior Cervical Nodes] : no posterior cervical lymphadenopathy [Normal Anterior Cervical Nodes] : no anterior cervical lymphadenopathy [No CVA Tenderness] : no CVA  tenderness [No Spinal Tenderness] : no spinal tenderness [No Joint Swelling] : no joint swelling [Grossly Normal Strength/Tone] : grossly normal strength/tone [No Rash] : no rash [Coordination Grossly Intact] : coordination grossly intact [No Focal Deficits] : no focal deficits [Normal Gait] : normal gait [Speech Grossly Normal] : speech grossly normal [Memory Grossly Normal] : memory grossly normal [Normal Affect] : the affect was normal [Alert and Oriented x3] : oriented to person, place, and time [Normal Mood] : the mood was normal [Normal Insight/Judgement] : insight and judgment were intact [de-identified] : Obese [de-identified] : Patient wearing protective face mask  [de-identified] : Right thyroid nodule [de-identified] : 2+ b/l LE pitting edema

## 2022-04-04 NOTE — HEALTH RISK ASSESSMENT
[Very Good] : ~his/her~  mood as very good [Never] : Never [Yes] : Yes [2 - 4 times a month (2 pts)] : 2-4 times a month (2 points) [1 or 2 (0 pts)] : 1 or 2 (0 points) [Never (0 pts)] : Never (0 points) [No] : In the past 12 months have you used drugs other than those required for medical reasons? No [Any fall with injury in past year] : Patient reported fall with injury in the past year [0] : 2) Feeling down, depressed, or hopeless: Not at all (0) [PHQ-2 Negative - No further assessment needed] : PHQ-2 Negative - No further assessment needed [Patient reported mammogram was normal] : Patient reported mammogram was normal [Patient reported bone density results were normal] : Patient reported bone density results were normal [HIV Test offered] : HIV Test offered [Hepatitis C test offered] : Hepatitis C test offered [None] : None [With Family] : lives with family [# of Members in Household ___] :  household currently consist of [unfilled] member(s) [Retired] : retired [High School] : high school [] :  [# Of Children ___] : has [unfilled] children [Sexually Active] : sexually active [Feels Safe at Home] : Feels safe at home [Fully functional (bathing, dressing, toileting, transferring, walking, feeding)] : Fully functional (bathing, dressing, toileting, transferring, walking, feeding) [Fully functional (using the telephone, shopping, preparing meals, housekeeping, doing laundry, using] : Fully functional and needs no help or supervision to perform IADLs (using the telephone, shopping, preparing meals, housekeeping, doing laundry, using transportation, managing medications and managing finances) [Reports changes in hearing] : Reports changes in hearing [Reports changes in vision] : Reports changes in vision [Smoke Detector] : smoke detector [Carbon Monoxide Detector] : carbon monoxide detector [Seat Belt] :  uses seat belt [Sunscreen] : uses sunscreen [With Patient/Caregiver] : , with patient/caregiver [Name: ___] : Health Care Proxy's Name: [unfilled]  [Relationship: ___] : Relationship: [unfilled] [Aggressive treatment] : aggressive treatment [I will adhere to the patient's wishes.] : I will adhere to the patient's wishes. [Time Spent: ___ minutes] : Time Spent: [unfilled] minutes [FreeTextEntry1] : Essential hypertension.  [de-identified] : Orthopedic surgery [Audit-CScore] : 2 [de-identified] : Physical therapy [de-identified] : Regular [[id number]] : 0 [High Risk Behavior] : no high risk behavior [Reports changes in dental health] : Reports no changes in dental health [Guns at Home] : no guns at home [MammogramDate] : 09/21 [BoneDensityDate] : 03/19 [de-identified] : Hearing loss [de-identified] : Patient wears corrective lens [AdvancecareDate] : 04/22

## 2022-04-04 NOTE — PLAN
[FreeTextEntry1] : - As above\par - Low sodium diet\par - Ambulatory BP monitoring\par - Short interval follow up with home BP machine for validation\par - Patient voiced understanding of above. \par  - Labs done in office\par - Further management pending lab results

## 2022-04-06 LAB
25(OH)D3 SERPL-MCNC: 48.7 NG/ML
ALBUMIN SERPL ELPH-MCNC: 5 G/DL
ALP BLD-CCNC: 117 U/L
ALT SERPL-CCNC: 27 U/L
ANION GAP SERPL CALC-SCNC: 14 MMOL/L
AST SERPL-CCNC: 22 U/L
BASOPHILS # BLD AUTO: 0.04 K/UL
BASOPHILS NFR BLD AUTO: 0.5 %
BILIRUB SERPL-MCNC: 0.7 MG/DL
BUN SERPL-MCNC: 12 MG/DL
CALCIUM SERPL-MCNC: 10.3 MG/DL
CHLORIDE SERPL-SCNC: 99 MMOL/L
CHOLEST SERPL-MCNC: 183 MG/DL
CO2 SERPL-SCNC: 26 MMOL/L
CREAT SERPL-MCNC: 0.64 MG/DL
EGFR: 95 ML/MIN/1.73M2
EOSINOPHIL # BLD AUTO: 0.21 K/UL
EOSINOPHIL NFR BLD AUTO: 2.6 %
ESTIMATED AVERAGE GLUCOSE: 108 MG/DL
GLUCOSE SERPL-MCNC: 86 MG/DL
HBA1C MFR BLD HPLC: 5.4 %
HCT VFR BLD CALC: 49.3 %
HCV AB SER QL: NONREACTIVE
HCV S/CO RATIO: 0.08 S/CO
HDLC SERPL-MCNC: 71 MG/DL
HGB BLD-MCNC: 16 G/DL
HIV1+2 AB SPEC QL IA.RAPID: NONREACTIVE
IMM GRANULOCYTES NFR BLD AUTO: 0.3 %
LDLC SERPL CALC-MCNC: 77 MG/DL
LYMPHOCYTES # BLD AUTO: 2.9 K/UL
LYMPHOCYTES NFR BLD AUTO: 36.3 %
MAN DIFF?: NORMAL
MCHC RBC-ENTMCNC: 28.6 PG
MCHC RBC-ENTMCNC: 32.5 GM/DL
MCV RBC AUTO: 88.2 FL
MONOCYTES # BLD AUTO: 0.64 K/UL
MONOCYTES NFR BLD AUTO: 8 %
NEUTROPHILS # BLD AUTO: 4.18 K/UL
NEUTROPHILS NFR BLD AUTO: 52.3 %
NONHDLC SERPL-MCNC: 112 MG/DL
PLATELET # BLD AUTO: 298 K/UL
POTASSIUM SERPL-SCNC: 3.5 MMOL/L
PROT SERPL-MCNC: 7.4 G/DL
RBC # BLD: 5.59 M/UL
RBC # FLD: 14.1 %
SODIUM SERPL-SCNC: 138 MMOL/L
TRIGL SERPL-MCNC: 174 MG/DL
TSH SERPL-ACNC: 1.56 UIU/ML
VIT B12 SERPL-MCNC: 1018 PG/ML
WBC # FLD AUTO: 7.99 K/UL

## 2022-04-19 LAB — METHYLMALONATE SERPL-SCNC: 154 NMOL/L

## 2022-04-25 ENCOUNTER — APPOINTMENT (OUTPATIENT)
Dept: INTERNAL MEDICINE | Facility: CLINIC | Age: 72
End: 2022-04-25
Payer: MEDICARE

## 2022-04-25 PROCEDURE — 93306 TTE W/DOPPLER COMPLETE: CPT

## 2022-05-09 ENCOUNTER — APPOINTMENT (OUTPATIENT)
Dept: INTERNAL MEDICINE | Facility: CLINIC | Age: 72
End: 2022-05-09
Payer: MEDICARE

## 2022-05-09 VITALS
HEART RATE: 92 BPM | BODY MASS INDEX: 30.66 KG/M2 | OXYGEN SATURATION: 96 % | TEMPERATURE: 97.7 F | HEIGHT: 65 IN | SYSTOLIC BLOOD PRESSURE: 157 MMHG | WEIGHT: 184 LBS | DIASTOLIC BLOOD PRESSURE: 91 MMHG

## 2022-05-09 PROCEDURE — 99213 OFFICE O/P EST LOW 20 MIN: CPT

## 2022-05-09 NOTE — PHYSICAL EXAM
[No Acute Distress] : no acute distress [Well Nourished] : well nourished [Well Developed] : well developed [Well-Appearing] : well-appearing [No Respiratory Distress] : no respiratory distress  [No Accessory Muscle Use] : no accessory muscle use [Clear to Auscultation] : lungs were clear to auscultation bilaterally [Normal Rate] : normal rate  [Regular Rhythm] : with a regular rhythm [Normal S1, S2] : normal S1 and S2 [No Murmur] : no murmur heard [Speech Grossly Normal] : speech grossly normal [Memory Grossly Normal] : memory grossly normal [Normal Affect] : the affect was normal [Alert and Oriented x3] : oriented to person, place, and time [Normal Mood] : the mood was normal [Normal Insight/Judgement] : insight and judgment were intact [de-identified] : Obese [de-identified] : Patient wearing protective face mask  [de-identified] : 1+ b/l LE pitting edema

## 2022-05-09 NOTE — HISTORY OF PRESENT ILLNESS
[FreeTextEntry1] : Follow up for HTN  [de-identified] : Patient presents for follow up for her chronic medical conditions. She reports compliance with all prescribed medical therapy and dietary restrictions. Average BP readings at home are in the 130s/60s.

## 2022-05-09 NOTE — ASSESSMENT
[FreeTextEntry1] : 72 y.o. woman with multiple medical comorbidities now with mildly elevated blood pressure

## 2022-05-16 NOTE — ASSESSMENT
[Patient Optimized for Surgery] : Patient optimized for surgery [FreeTextEntry4] : 71 y.o. woman with multiple medical comorbidities now with left ankle fracture and positive UA [place] Pregnancy And Lactation Text: This medication is Pregnancy Category X and contraindicated in pregnancy and in women who may become pregnant. It is unknown if this medication is excreted in breast milk.

## 2022-06-20 ENCOUNTER — APPOINTMENT (OUTPATIENT)
Dept: INTERNAL MEDICINE | Facility: CLINIC | Age: 72
End: 2022-06-20
Payer: MEDICARE

## 2022-06-20 VITALS
BODY MASS INDEX: 31.12 KG/M2 | TEMPERATURE: 97.7 F | WEIGHT: 187 LBS | DIASTOLIC BLOOD PRESSURE: 73 MMHG | HEART RATE: 75 BPM | SYSTOLIC BLOOD PRESSURE: 122 MMHG | OXYGEN SATURATION: 95 %

## 2022-06-20 PROCEDURE — 99214 OFFICE O/P EST MOD 30 MIN: CPT

## 2022-06-20 NOTE — PHYSICAL EXAM
[No Acute Distress] : no acute distress [Well Nourished] : well nourished [Well Developed] : well developed [Well-Appearing] : well-appearing [No Respiratory Distress] : no respiratory distress  [No Accessory Muscle Use] : no accessory muscle use [Clear to Auscultation] : lungs were clear to auscultation bilaterally [Normal Rate] : normal rate  [Regular Rhythm] : with a regular rhythm [Normal S1, S2] : normal S1 and S2 [No Murmur] : no murmur heard [Speech Grossly Normal] : speech grossly normal [Memory Grossly Normal] : memory grossly normal [Normal Affect] : the affect was normal [Alert and Oriented x3] : oriented to person, place, and time [Normal Mood] : the mood was normal [Normal Insight/Judgement] : insight and judgment were intact [de-identified] : Obese [de-identified] : Patient wearing protective face mask  [de-identified] : 1+ b/l LE pitting edema

## 2022-06-20 NOTE — HISTORY OF PRESENT ILLNESS
[FreeTextEntry1] : Follow up for HTN  [de-identified] : Patient presents for follow up for her chronic medical conditions. She reports compliance with all prescribed medical therapy and dietary restrictions. Average BP readings are in the 120s/60s. No complaints at present.

## 2022-07-27 ENCOUNTER — TRANSCRIPTION ENCOUNTER (OUTPATIENT)
Age: 72
End: 2022-07-27

## 2022-09-21 ENCOUNTER — APPOINTMENT (OUTPATIENT)
Dept: INTERNAL MEDICINE | Facility: CLINIC | Age: 72
End: 2022-09-21

## 2022-09-29 ENCOUNTER — APPOINTMENT (OUTPATIENT)
Dept: INTERNAL MEDICINE | Facility: CLINIC | Age: 72
End: 2022-09-29

## 2022-09-29 VITALS
BODY MASS INDEX: 31.16 KG/M2 | WEIGHT: 187 LBS | OXYGEN SATURATION: 97 % | HEART RATE: 64 BPM | DIASTOLIC BLOOD PRESSURE: 83 MMHG | SYSTOLIC BLOOD PRESSURE: 148 MMHG | HEIGHT: 65 IN

## 2022-09-29 VITALS — SYSTOLIC BLOOD PRESSURE: 140 MMHG | DIASTOLIC BLOOD PRESSURE: 80 MMHG

## 2022-09-29 DIAGNOSIS — Z13.820 ENCOUNTER FOR SCREENING FOR OSTEOPOROSIS: ICD-10-CM

## 2022-09-29 PROCEDURE — 99214 OFFICE O/P EST MOD 30 MIN: CPT | Mod: 25

## 2022-09-29 PROCEDURE — G0008: CPT

## 2022-09-29 PROCEDURE — 90662 IIV NO PRSV INCREASED AG IM: CPT

## 2022-09-29 PROCEDURE — 36415 COLL VENOUS BLD VENIPUNCTURE: CPT

## 2022-09-29 NOTE — HISTORY OF PRESENT ILLNESS
[FreeTextEntry1] : F/u exam\par Wants to get a flu vaccine and to repeat a blood test [de-identified] : Ms. ANIVAL ROMERO 72 year  female  with a PMH HTN, hyperlipidemia, thyroid nodule   present to the office to a f/u and establish care with a new pcp.  Patient feels good, denies complaints at present time. Wants to get a flu vaccine(doing flu vaccine yearly) and to do a blood test\par

## 2022-09-29 NOTE — PLAN
[FreeTextEntry1] : F/u exam is performed. Recommend  to do a blood test today, further management will depend on the blood test results. \par Meanwhile continue present meds\par Patient will get a flu vaccine today(side ffect of the vaccine explained to the patient)\par Recommend to do a dexa test\par Do mammo, breast u/s(referred by a gyn)\par Repeat a thyroid u/s on 04/23, f/u with endo\par  RTC to f/u in 2 wks. Patient is verbalized understanding\par

## 2022-10-03 LAB
ALBUMIN SERPL ELPH-MCNC: 4.9 G/DL
ALP BLD-CCNC: 103 U/L
ALT SERPL-CCNC: 21 U/L
ANION GAP SERPL CALC-SCNC: 12 MMOL/L
AST SERPL-CCNC: 19 U/L
BASOPHILS # BLD AUTO: 0.04 K/UL
BASOPHILS NFR BLD AUTO: 0.5 %
BILIRUB DIRECT SERPL-MCNC: 0.2 MG/DL
BILIRUB INDIRECT SERPL-MCNC: 0.4 MG/DL
BILIRUB SERPL-MCNC: 0.5 MG/DL
BUN SERPL-MCNC: 16 MG/DL
CALCIUM SERPL-MCNC: 10.1 MG/DL
CHLORIDE SERPL-SCNC: 103 MMOL/L
CHOLEST SERPL-MCNC: 170 MG/DL
CO2 SERPL-SCNC: 29 MMOL/L
CREAT SERPL-MCNC: 0.77 MG/DL
EGFR: 83 ML/MIN/1.73M2
EOSINOPHIL # BLD AUTO: 0.32 K/UL
EOSINOPHIL NFR BLD AUTO: 4.1 %
GLUCOSE SERPL-MCNC: 89 MG/DL
HCT VFR BLD CALC: 48 %
HDLC SERPL-MCNC: 58 MG/DL
HGB BLD-MCNC: 15.6 G/DL
IMM GRANULOCYTES NFR BLD AUTO: 0.3 %
LDLC SERPL CALC-MCNC: 72 MG/DL
LYMPHOCYTES # BLD AUTO: 2.76 K/UL
LYMPHOCYTES NFR BLD AUTO: 35.6 %
MAN DIFF?: NORMAL
MCHC RBC-ENTMCNC: 29.3 PG
MCHC RBC-ENTMCNC: 32.5 GM/DL
MCV RBC AUTO: 90.2 FL
MONOCYTES # BLD AUTO: 0.67 K/UL
MONOCYTES NFR BLD AUTO: 8.6 %
NEUTROPHILS # BLD AUTO: 3.95 K/UL
NEUTROPHILS NFR BLD AUTO: 50.9 %
NONHDLC SERPL-MCNC: 112 MG/DL
PLATELET # BLD AUTO: 272 K/UL
POTASSIUM SERPL-SCNC: 3.9 MMOL/L
PROT SERPL-MCNC: 7.2 G/DL
RBC # BLD: 5.32 M/UL
RBC # FLD: 13.8 %
SODIUM SERPL-SCNC: 144 MMOL/L
TRIGL SERPL-MCNC: 202 MG/DL
TSH SERPL-ACNC: 1.41 UIU/ML
WBC # FLD AUTO: 7.76 K/UL

## 2022-10-17 ENCOUNTER — NON-APPOINTMENT (OUTPATIENT)
Age: 72
End: 2022-10-17

## 2023-02-06 ENCOUNTER — APPOINTMENT (OUTPATIENT)
Dept: INTERNAL MEDICINE | Facility: CLINIC | Age: 73
End: 2023-02-06
Payer: MEDICARE

## 2023-02-06 VITALS
HEIGHT: 65 IN | TEMPERATURE: 97.5 F | DIASTOLIC BLOOD PRESSURE: 82 MMHG | BODY MASS INDEX: 30.82 KG/M2 | OXYGEN SATURATION: 96 % | HEART RATE: 69 BPM | SYSTOLIC BLOOD PRESSURE: 147 MMHG | WEIGHT: 185 LBS

## 2023-02-06 VITALS — DIASTOLIC BLOOD PRESSURE: 80 MMHG | SYSTOLIC BLOOD PRESSURE: 130 MMHG

## 2023-02-06 DIAGNOSIS — D58.2 OTHER HEMOGLOBINOPATHIES: ICD-10-CM

## 2023-02-06 DIAGNOSIS — Z12.11 ENCOUNTER FOR SCREENING FOR MALIGNANT NEOPLASM OF COLON: ICD-10-CM

## 2023-02-06 PROCEDURE — 36415 COLL VENOUS BLD VENIPUNCTURE: CPT

## 2023-02-06 PROCEDURE — 99214 OFFICE O/P EST MOD 30 MIN: CPT | Mod: 25

## 2023-02-06 NOTE — HISTORY OF PRESENT ILLNESS
[FreeTextEntry1] : F/u exam [de-identified] : Ms. ANIVAL ROMERO 72 year female with a PMH HTN, hyperlipidemia, thyroid nodule present to the office to a f/u. Wants to do a blood test. atient feels good, denies complaints at present time.

## 2023-02-07 LAB
ALBUMIN SERPL ELPH-MCNC: 4.5 G/DL
ALP BLD-CCNC: 100 U/L
ALT SERPL-CCNC: 20 U/L
ANION GAP SERPL CALC-SCNC: 13 MMOL/L
AST SERPL-CCNC: 18 U/L
BASOPHILS # BLD AUTO: 0.04 K/UL
BASOPHILS NFR BLD AUTO: 0.6 %
BILIRUB SERPL-MCNC: 0.6 MG/DL
BUN SERPL-MCNC: 15 MG/DL
CALCIUM SERPL-MCNC: 10 MG/DL
CHLORIDE SERPL-SCNC: 99 MMOL/L
CHOLEST SERPL-MCNC: 155 MG/DL
CO2 SERPL-SCNC: 27 MMOL/L
CREAT SERPL-MCNC: 0.81 MG/DL
EGFR: 78 ML/MIN/1.73M2
EOSINOPHIL # BLD AUTO: 0.34 K/UL
EOSINOPHIL NFR BLD AUTO: 5 %
GLUCOSE SERPL-MCNC: 97 MG/DL
HCT VFR BLD CALC: 47.3 %
HDLC SERPL-MCNC: 63 MG/DL
HGB BLD-MCNC: 15.4 G/DL
IMM GRANULOCYTES NFR BLD AUTO: 0.1 %
LDLC SERPL CALC-MCNC: 62 MG/DL
LYMPHOCYTES # BLD AUTO: 2.2 K/UL
LYMPHOCYTES NFR BLD AUTO: 32.5 %
MAN DIFF?: NORMAL
MCHC RBC-ENTMCNC: 29.2 PG
MCHC RBC-ENTMCNC: 32.6 GM/DL
MCV RBC AUTO: 89.8 FL
MONOCYTES # BLD AUTO: 0.55 K/UL
MONOCYTES NFR BLD AUTO: 8.1 %
NEUTROPHILS # BLD AUTO: 3.62 K/UL
NEUTROPHILS NFR BLD AUTO: 53.7 %
NONHDLC SERPL-MCNC: 92 MG/DL
PLATELET # BLD AUTO: 263 K/UL
POTASSIUM SERPL-SCNC: 3.9 MMOL/L
PROT SERPL-MCNC: 6.8 G/DL
RBC # BLD: 5.27 M/UL
RBC # FLD: 13.4 %
SODIUM SERPL-SCNC: 139 MMOL/L
TRIGL SERPL-MCNC: 149 MG/DL
TSH SERPL-ACNC: 1.98 UIU/ML
WBC # FLD AUTO: 6.76 K/UL

## 2023-04-24 ENCOUNTER — NON-APPOINTMENT (OUTPATIENT)
Age: 73
End: 2023-04-24

## 2023-05-22 ENCOUNTER — APPOINTMENT (OUTPATIENT)
Dept: INTERNAL MEDICINE | Facility: CLINIC | Age: 73
End: 2023-05-22
Payer: MEDICARE

## 2023-05-22 VITALS
HEART RATE: 67 BPM | SYSTOLIC BLOOD PRESSURE: 145 MMHG | WEIGHT: 187 LBS | BODY MASS INDEX: 31.16 KG/M2 | OXYGEN SATURATION: 96 % | HEIGHT: 65 IN | TEMPERATURE: 98.3 F | DIASTOLIC BLOOD PRESSURE: 77 MMHG

## 2023-05-22 PROCEDURE — 36415 COLL VENOUS BLD VENIPUNCTURE: CPT

## 2023-05-22 PROCEDURE — 99214 OFFICE O/P EST MOD 30 MIN: CPT | Mod: 25

## 2023-05-22 NOTE — PLAN
[FreeTextEntry1] : Ms. ANIVAL ROMERO 73 year female with a PMH HTN, hyperlipidemia, thyroid nodule present to the office to a f/u.\par F/u  exam is performed. Recommend  to do a blood test today, further management will depend on the blood test results. \par HTN is well controlled, recommend to continue labetalol, olmesartan/amlodipine/hctz\par Hypokalemia continue klorcon 10meq\par Hyperlipidemia continue lipitor, further management will depend on the blood test results\par F/u with GI, screening colonoscopy\par  RTC to f/u in 2 wks. Patient is verbalized understanding\par

## 2023-05-22 NOTE — HISTORY OF PRESENT ILLNESS
[FreeTextEntry1] : F/u exam [de-identified] : Ms. ANIVAL ROMERO 73 year female with a PMH HTN, hyperlipidemia, thyroid nodule present to the office to a f/u. Wants to do a blood test. Patient feels good, denies complaints at present time\par Needs to get refills on her meds

## 2023-05-23 LAB
ALBUMIN SERPL ELPH-MCNC: 4.6 G/DL
ALP BLD-CCNC: 104 U/L
ALT SERPL-CCNC: 18 U/L
ANION GAP SERPL CALC-SCNC: 12 MMOL/L
AST SERPL-CCNC: 17 U/L
BILIRUB DIRECT SERPL-MCNC: 0.2 MG/DL
BILIRUB INDIRECT SERPL-MCNC: 0.4 MG/DL
BILIRUB SERPL-MCNC: 0.6 MG/DL
BUN SERPL-MCNC: 12 MG/DL
CALCIUM SERPL-MCNC: 9.7 MG/DL
CHLORIDE SERPL-SCNC: 100 MMOL/L
CHOLEST SERPL-MCNC: 161 MG/DL
CO2 SERPL-SCNC: 30 MMOL/L
CREAT SERPL-MCNC: 0.77 MG/DL
EGFR: 82 ML/MIN/1.73M2
GLUCOSE SERPL-MCNC: 99 MG/DL
HDLC SERPL-MCNC: 65 MG/DL
LDLC SERPL CALC-MCNC: 65 MG/DL
NONHDLC SERPL-MCNC: 96 MG/DL
POTASSIUM SERPL-SCNC: 4.3 MMOL/L
PROT SERPL-MCNC: 6.8 G/DL
SODIUM SERPL-SCNC: 142 MMOL/L
TRIGL SERPL-MCNC: 151 MG/DL
TSH SERPL-ACNC: 1.73 UIU/ML

## 2023-06-19 ENCOUNTER — RX RENEWAL (OUTPATIENT)
Age: 73
End: 2023-06-19

## 2023-09-18 ENCOUNTER — APPOINTMENT (OUTPATIENT)
Dept: INTERNAL MEDICINE | Facility: CLINIC | Age: 73
End: 2023-09-18
Payer: MEDICARE

## 2023-09-18 VITALS
WEIGHT: 196 LBS | SYSTOLIC BLOOD PRESSURE: 139 MMHG | BODY MASS INDEX: 32.65 KG/M2 | HEART RATE: 66 BPM | HEIGHT: 65 IN | DIASTOLIC BLOOD PRESSURE: 75 MMHG | OXYGEN SATURATION: 97 %

## 2023-09-18 LAB
ALBUMIN SERPL ELPH-MCNC: 4.5 G/DL
ALP BLD-CCNC: 98 U/L
ALT SERPL-CCNC: 17 U/L
ANION GAP SERPL CALC-SCNC: 13 MMOL/L
AST SERPL-CCNC: 16 U/L
BASOPHILS # BLD AUTO: 0.04 K/UL
BASOPHILS NFR BLD AUTO: 0.6 %
BILIRUB SERPL-MCNC: 0.6 MG/DL
BUN SERPL-MCNC: 13 MG/DL
CALCIUM SERPL-MCNC: 9.8 MG/DL
CHLORIDE SERPL-SCNC: 99 MMOL/L
CHOLEST SERPL-MCNC: 148 MG/DL
CO2 SERPL-SCNC: 27 MMOL/L
CREAT SERPL-MCNC: 0.79 MG/DL
EGFR: 80 ML/MIN/1.73M2
EOSINOPHIL # BLD AUTO: 0.29 K/UL
EOSINOPHIL NFR BLD AUTO: 4.1 %
GLUCOSE SERPL-MCNC: 98 MG/DL
HCT VFR BLD CALC: 46.3 %
HDLC SERPL-MCNC: 58 MG/DL
HGB BLD-MCNC: 15 G/DL
IMM GRANULOCYTES NFR BLD AUTO: 0.1 %
LDLC SERPL CALC-MCNC: 62 MG/DL
LYMPHOCYTES # BLD AUTO: 2.12 K/UL
LYMPHOCYTES NFR BLD AUTO: 30.3 %
MAN DIFF?: NORMAL
MCHC RBC-ENTMCNC: 29.8 PG
MCHC RBC-ENTMCNC: 32.4 GM/DL
MCV RBC AUTO: 91.9 FL
MONOCYTES # BLD AUTO: 0.55 K/UL
MONOCYTES NFR BLD AUTO: 7.9 %
NEUTROPHILS # BLD AUTO: 3.99 K/UL
NEUTROPHILS NFR BLD AUTO: 57 %
NONHDLC SERPL-MCNC: 90 MG/DL
PLATELET # BLD AUTO: 237 K/UL
POTASSIUM SERPL-SCNC: 4.1 MMOL/L
PROT SERPL-MCNC: 6.7 G/DL
RBC # BLD: 5.04 M/UL
RBC # FLD: 13.2 %
SODIUM SERPL-SCNC: 139 MMOL/L
TRIGL SERPL-MCNC: 167 MG/DL
TSH SERPL-ACNC: 1.63 UIU/ML
WBC # FLD AUTO: 7 K/UL

## 2023-09-18 PROCEDURE — 36415 COLL VENOUS BLD VENIPUNCTURE: CPT

## 2023-09-18 PROCEDURE — 99213 OFFICE O/P EST LOW 20 MIN: CPT | Mod: 25

## 2023-09-18 PROCEDURE — G0008: CPT

## 2023-09-18 PROCEDURE — 90662 IIV NO PRSV INCREASED AG IM: CPT

## 2023-10-26 ENCOUNTER — APPOINTMENT (OUTPATIENT)
Dept: INTERNAL MEDICINE | Facility: CLINIC | Age: 73
End: 2023-10-26
Payer: MEDICARE

## 2023-10-26 VITALS
BODY MASS INDEX: 30.82 KG/M2 | DIASTOLIC BLOOD PRESSURE: 60 MMHG | SYSTOLIC BLOOD PRESSURE: 130 MMHG | HEART RATE: 68 BPM | OXYGEN SATURATION: 98 % | WEIGHT: 185 LBS | HEIGHT: 65 IN

## 2023-10-26 DIAGNOSIS — M79.603 PAIN IN ARM, UNSPECIFIED: ICD-10-CM

## 2023-10-26 PROCEDURE — 99213 OFFICE O/P EST LOW 20 MIN: CPT

## 2023-10-30 ENCOUNTER — RX RENEWAL (OUTPATIENT)
Age: 73
End: 2023-10-30

## 2023-10-31 ENCOUNTER — NON-APPOINTMENT (OUTPATIENT)
Age: 73
End: 2023-10-31

## 2023-11-14 ENCOUNTER — RX RENEWAL (OUTPATIENT)
Age: 73
End: 2023-11-14

## 2023-12-27 ENCOUNTER — APPOINTMENT (OUTPATIENT)
Dept: RHEUMATOLOGY | Facility: CLINIC | Age: 73
End: 2023-12-27
Payer: MEDICARE

## 2023-12-27 VITALS
SYSTOLIC BLOOD PRESSURE: 132 MMHG | WEIGHT: 185 LBS | DIASTOLIC BLOOD PRESSURE: 60 MMHG | HEIGHT: 65 IN | OXYGEN SATURATION: 95 % | HEART RATE: 63 BPM | BODY MASS INDEX: 30.82 KG/M2

## 2023-12-27 DIAGNOSIS — M25.512 PAIN IN LEFT SHOULDER: ICD-10-CM

## 2023-12-27 DIAGNOSIS — M75.42 IMPINGEMENT SYNDROME OF LEFT SHOULDER: ICD-10-CM

## 2023-12-27 PROCEDURE — 99204 OFFICE O/P NEW MOD 45 MIN: CPT

## 2023-12-27 RX ORDER — CALCIUM CARBONATE/VITAMIN D3 500MG-5MCG
500-5 TABLET ORAL
Refills: 0 | Status: ACTIVE | COMMUNITY
Start: 2023-12-27

## 2023-12-27 NOTE — HISTORY OF PRESENT ILLNESS
[Arthralgias] : arthralgias [FreeTextEntry1] : 73 year old female with PMHx as listed below reports that about 3 months ago, she received the flu vaccine.  Later that day, she began to experience pain in her LUE.   However, the pain persistent for a week, so she spoke w/ her PMD, who recommended ibuprofen and heating pads, but it did not improve.  She then tried taking Aleve, which provided transient relief.  She was then sent for a CT shoulder which revealed OA and chondrocalcinosis.  The pain occurs primarily with activity, including reaching above her head or behind or back.  No pain at rest.  She describes the pain as a dull ache, had been 5-6 out of 10, but has improved to 3-4 out of 10.  She denies any joint swelling or erythema.  No AM stiffness.  She gets some relief from Aleve and from heating pads.  No other known alleviating factors.  No pain in the rest of her joints. No F/C, no unintentional weight loss, no night sweats, no oral ulcers, no rashes, no alopecia, no photosensitivity, no dry eyes/dry mouth, no Raynaud symptoms, no focal weakness, no dysphagia  [Anorexia] : no anorexia [Weight Loss] : no weight loss [Malaise] : no malaise [Fever] : no fever [Chills] : no chills [Fatigue] : no fatigue [Malar Facial Rash] : no malar facial rash [Skin Lesions] : no lesions [Skin Nodules] : no skin nodules [Oral Ulcers] : no oral ulcers [Cough] : no cough [Dry Mouth] : no dry mouth [Dysphonia] : no dysphonia [Dysphagia] : no dysphagia [Shortness of Breath] : no shortness of breath [Chest Pain] : no chest pain [Joint Swelling] : no joint swelling [Joint Warmth] : no joint warmth [Joint Deformity] : no joint deformity [Decreased ROM] : no decreased range of motion [Morning Stiffness] : no morning stiffness [Falls] : no falls [Difficulty Standing] : no difficulty standing [Difficulty Walking] : no difficulty walking [Dyspnea] : no dyspnea [Myalgias] : no myalgias [Muscle Weakness] : no muscle weakness [Muscle Spasms] : no muscle spasms [Muscle Cramping] : no muscle cramping [Visual Changes] : no visual changes [Eye Pain] : no eye pain [Eye Redness] : no eye redness [Dry Eyes] : no dry eyes

## 2023-12-27 NOTE — CONSULT LETTER
[Dear  ___] : Dear  [unfilled], [Consult Letter:] : I had the pleasure of evaluating your patient, [unfilled]. [Please see my note below.] : Please see my note below. [Consult Closing:] : Thank you very much for allowing me to participate in the care of this patient.  If you have any questions, please do not hesitate to contact me. [Sincerely,] : Sincerely, [FreeTextEntry3] : Neto Slater MD Rheumatology [facility] ochoa Yanez [facility] at [address] [place] [address]  phone:  [phone] fax:      [phone]

## 2023-12-27 NOTE — PHYSICAL EXAM
[General Appearance - Alert] : alert [General Appearance - In No Acute Distress] : in no acute distress [Sclera] : the sclera and conjunctiva were normal [Outer Ear] : the ears and nose were normal in appearance [Oropharynx] : the oropharynx was normal [Neck Appearance] : the appearance of the neck was normal [Neck Cervical Mass (___cm)] : no neck mass was observed [Jugular Venous Distention Increased] : there was no jugular-venous distention [Thyroid Diffuse Enlargement] : the thyroid was not enlarged [Thyroid Nodule] : there were no palpable thyroid nodules [Auscultation Breath Sounds / Voice Sounds] : lungs were clear to auscultation bilaterally [Heart Rate And Rhythm] : heart rate was normal and rhythm regular [Heart Sounds] : normal S1 and S2 [Heart Sounds Gallop] : no gallops [Murmurs] : no murmurs [Heart Sounds Pericardial Friction Rub] : no pericardial rub [Edema] : there was no peripheral edema [Bowel Sounds] : normal bowel sounds [Abdomen Soft] : soft [Abdomen Tenderness] : non-tender [Abdomen Mass (___ Cm)] : no abdominal mass palpated [Cervical Lymph Nodes Enlarged Posterior Bilaterally] : posterior cervical [Cervical Lymph Nodes Enlarged Anterior Bilaterally] : anterior cervical [Supraclavicular Lymph Nodes Enlarged Bilaterally] : supraclavicular [No Spinal Tenderness] : no spinal tenderness [Skin Color & Pigmentation] : normal skin color and pigmentation [Skin Turgor] : normal skin turgor [] : no rash [No Focal Deficits] : no focal deficits [Oriented To Time, Place, And Person] : oriented to person, place, and time [Impaired Insight] : insight and judgment were intact [Affect] : the affect was normal [FreeTextEntry1] : No synovitis;  left shoulder w/ pain upon abduction and internal rotation, (+)impingement;  normal ROM in rest of joints

## 2023-12-27 NOTE — ASSESSMENT
[FreeTextEntry1] : 73 year old female presents with acute-onset left shoulder pain, which has now been occurring for about 3 months.  While a recent CT showed evidence of OA and chondrocalcinosis, her presentation is not c/w OA, which would not start so acutely, nor w/pseudogout, given the lack of inflammatory symptoms (no swelling, erythema, or warmth) and given the duration of her symptoms.  Her symptoms are more suggestive rotator cuff tendonitis or tear +/- subacromial bursitis.  I have therefore ordered an MRI as further w/u.  In the meantime, I have recommended conservative treatment, including exercises, naproxen and/or Tylenol prn, warm compresses, and topical analgesics.  If tendonitis and/or bursitis are confirmed and there is no improvement by her next visit, she will consider a corticosteroid injection at that time.

## 2024-01-29 ENCOUNTER — APPOINTMENT (OUTPATIENT)
Dept: INTERNAL MEDICINE | Facility: CLINIC | Age: 74
End: 2024-01-29
Payer: MEDICARE

## 2024-01-29 VITALS
BODY MASS INDEX: 30.82 KG/M2 | DIASTOLIC BLOOD PRESSURE: 82 MMHG | WEIGHT: 185 LBS | OXYGEN SATURATION: 97 % | HEART RATE: 76 BPM | SYSTOLIC BLOOD PRESSURE: 132 MMHG | HEIGHT: 65 IN | TEMPERATURE: 98 F

## 2024-01-29 DIAGNOSIS — E55.9 VITAMIN D DEFICIENCY, UNSPECIFIED: ICD-10-CM

## 2024-01-29 DIAGNOSIS — E87.6 HYPOKALEMIA: ICD-10-CM

## 2024-01-29 PROCEDURE — 36415 COLL VENOUS BLD VENIPUNCTURE: CPT

## 2024-01-29 PROCEDURE — G2211 COMPLEX E/M VISIT ADD ON: CPT

## 2024-01-29 PROCEDURE — 99214 OFFICE O/P EST MOD 30 MIN: CPT

## 2024-01-29 NOTE — PLAN
[FreeTextEntry1] : Ms. ANIVAL ROMERO 73 year female with a PMH HTN, hyperlipidemia, thyroid nodule present to the office to a f/u. F/u  exam is performed. Recommend  to do a blood test today, further management will depend on the blood test results.  HTN is well controlled, recommend to continue labetalol, olmesartan/amlodipine/hctz Hypokalemia continue klorcon 10meq Hyperlipidemia continue lipitor, further management will depend on the blood test results F/u with GI Do a thyroid u/s on 04/2024 Do mammogram, has an appoitment next mo(referred by a gyn)  RTC to f/u in 2 wks. Patient is verbalized understanding

## 2024-01-29 NOTE — HISTORY OF PRESENT ILLNESS
[FreeTextEntry1] : F/u exam [de-identified] : Ms. ANIVAL ROMERO 73 year female with a PMH HTN, hyperlipidemia, thyroid nodule present to the office to a f/u. Wants to do a blood test today. Patient feels good, denies complaints at present time. Recently was seen by an orthopedics, got an injection of cortisone on the L shoulder, felt better.

## 2024-01-30 LAB
25(OH)D3 SERPL-MCNC: 42 NG/ML
ALBUMIN SERPL ELPH-MCNC: 4.4 G/DL
ALP BLD-CCNC: 106 U/L
ALT SERPL-CCNC: 19 U/L
ANION GAP SERPL CALC-SCNC: 12 MMOL/L
AST SERPL-CCNC: 16 U/L
BILIRUB DIRECT SERPL-MCNC: 0.2 MG/DL
BILIRUB INDIRECT SERPL-MCNC: 0.4 MG/DL
BILIRUB SERPL-MCNC: 0.5 MG/DL
BUN SERPL-MCNC: 20 MG/DL
CALCIUM SERPL-MCNC: 10.2 MG/DL
CHLORIDE SERPL-SCNC: 102 MMOL/L
CHOLEST SERPL-MCNC: 160 MG/DL
CO2 SERPL-SCNC: 28 MMOL/L
CREAT SERPL-MCNC: 0.75 MG/DL
EGFR: 85 ML/MIN/1.73M2
GLUCOSE SERPL-MCNC: 97 MG/DL
HDLC SERPL-MCNC: 67 MG/DL
LDLC SERPL CALC-MCNC: 68 MG/DL
NONHDLC SERPL-MCNC: 93 MG/DL
POTASSIUM SERPL-SCNC: 3.9 MMOL/L
PROT SERPL-MCNC: 6.8 G/DL
SODIUM SERPL-SCNC: 142 MMOL/L
TRIGL SERPL-MCNC: 147 MG/DL
TSH SERPL-ACNC: 1.83 UIU/ML

## 2024-02-14 ENCOUNTER — APPOINTMENT (OUTPATIENT)
Dept: RHEUMATOLOGY | Facility: CLINIC | Age: 74
End: 2024-02-14

## 2024-02-15 ENCOUNTER — RX RENEWAL (OUTPATIENT)
Age: 74
End: 2024-02-15

## 2024-02-15 RX ORDER — POTASSIUM CHLORIDE 750 MG/1
10 TABLET, EXTENDED RELEASE ORAL
Qty: 180 | Refills: 0 | Status: ACTIVE | COMMUNITY
Start: 2024-02-15 | End: 1900-01-01

## 2024-03-26 DIAGNOSIS — U07.1 COVID-19: ICD-10-CM

## 2024-03-26 RX ORDER — NIRMATRELVIR AND RITONAVIR 300-100 MG
20 X 150 MG & KIT ORAL
Qty: 30 | Refills: 0 | Status: ACTIVE | COMMUNITY
Start: 2024-03-26 | End: 1900-01-01

## 2024-05-02 ENCOUNTER — LABORATORY RESULT (OUTPATIENT)
Age: 74
End: 2024-05-02

## 2024-05-02 ENCOUNTER — APPOINTMENT (OUTPATIENT)
Dept: INTERNAL MEDICINE | Facility: CLINIC | Age: 74
End: 2024-05-02
Payer: MEDICARE

## 2024-05-02 ENCOUNTER — NON-APPOINTMENT (OUTPATIENT)
Age: 74
End: 2024-05-02

## 2024-05-02 VITALS
SYSTOLIC BLOOD PRESSURE: 152 MMHG | OXYGEN SATURATION: 94 % | WEIGHT: 188 LBS | DIASTOLIC BLOOD PRESSURE: 81 MMHG | BODY MASS INDEX: 31.29 KG/M2 | HEART RATE: 66 BPM

## 2024-05-02 DIAGNOSIS — Z12.83 ENCOUNTER FOR SCREENING FOR MALIGNANT NEOPLASM OF SKIN: ICD-10-CM

## 2024-05-02 DIAGNOSIS — H91.93 UNSPECIFIED HEARING LOSS, BILATERAL: ICD-10-CM

## 2024-05-02 DIAGNOSIS — Z00.00 ENCOUNTER FOR GENERAL ADULT MEDICAL EXAMINATION W/OUT ABNORMAL FINDINGS: ICD-10-CM

## 2024-05-02 DIAGNOSIS — H35.30 UNSPECIFIED MACULAR DEGENERATION: ICD-10-CM

## 2024-05-02 DIAGNOSIS — I44.0 ATRIOVENTRICULAR BLOCK, FIRST DEGREE: ICD-10-CM

## 2024-05-02 DIAGNOSIS — E04.1 NONTOXIC SINGLE THYROID NODULE: ICD-10-CM

## 2024-05-02 LAB
ESTIMATED AVERAGE GLUCOSE: 111 MG/DL
HBA1C MFR BLD HPLC: 5.5 %
HCT VFR BLD CALC: 43.5 %
HGB BLD-MCNC: 14.2 G/DL
MCHC RBC-ENTMCNC: 29.3 PG
MCHC RBC-ENTMCNC: 32.6 GM/DL
MCV RBC AUTO: 89.9 FL
PLATELET # BLD AUTO: 274 K/UL
RBC # BLD: 4.84 M/UL
RBC # FLD: 13.4 %
WBC # FLD AUTO: 9.82 K/UL

## 2024-05-02 PROCEDURE — 36415 COLL VENOUS BLD VENIPUNCTURE: CPT

## 2024-05-02 PROCEDURE — 93000 ELECTROCARDIOGRAM COMPLETE: CPT

## 2024-05-02 PROCEDURE — G0439: CPT

## 2024-05-02 NOTE — PHYSICAL EXAM
[TextEntry] : Constitutional: Well developed, well appearing, not in acute distress Head: Normocephalic, no lesions Eyes: PERRLA, conjunctiva is NL, clear Ear: Ear canal is normal, tympanic membrane is intact Nose: Nasal turbinates are NL Throat: Clear, no exudates, has some small red lesion on the soft palate on the R side of the throat Neck: Supple, no masses Chest: Lungs are clear, no rales, no rhonchi, no wheezing Heart: Regular rate, no murmurs, no rubs, no gallops Breast: b/l breast is symmetric, areola and nipple is NL, breast is multinodular, dense. Axillary LN is NL b/l Abdomen: Soft, no tenderness, no masses, bowel sounds are normal : No CVAT Extremeties: FROM, no deformities, no edema Muskuloskeletal: has no tenderness of the spine, ROM is Nl Skin: Has multiple freckles on the back, arms. Has an age related and sun damage skin changes on the face, arms, upper back. Has some nevuses on the back Neuro: AAO x 3, no focal neurological deficit. Psychiatric: oriented to person, place, and time and insight and judgment were intact.

## 2024-05-02 NOTE — HEALTH RISK ASSESSMENT
[Good] : ~his/her~  mood as  good [Yes] : Yes [Monthly or less (1 pt)] : Monthly or less (1 point) [1 or 2 (0 pts)] : 1 or 2 (0 points) [Never (0 pts)] : Never (0 points) [No] : In the past 12 months have you used drugs other than those required for medical reasons? No [No falls in past year] : Patient reported no falls in the past year [Little interest or pleasure doing things] : 1) Little interest or pleasure doing things [Feeling down, depressed, or hopeless] : 2) Feeling down, depressed, or hopeless [0] : 2) Feeling down, depressed, or hopeless: Not at all (0) [PHQ-2 Negative - No further assessment needed] : PHQ-2 Negative - No further assessment needed [No Retinopathy] : No retinopathy [Patient reported mammogram was normal] : Patient reported mammogram was normal [Patient reported PAP Smear was normal] : Patient reported PAP Smear was normal [HIV Test offered] : HIV Test offered [Hepatitis C test offered] : Hepatitis C test offered [None] : None [# of Members in Household ___] :  household currently consist of [unfilled] member(s) [Retired] : retired [High School] : high school [] :  [# Of Children ___] : has [unfilled] children [Sexually Active] : sexually active [Feels Safe at Home] : Feels safe at home [Fully functional (bathing, dressing, toileting, transferring, walking, feeding)] : Fully functional (bathing, dressing, toileting, transferring, walking, feeding) [Fully functional (using the telephone, shopping, preparing meals, housekeeping, doing laundry, using] : Fully functional and needs no help or supervision to perform IADLs (using the telephone, shopping, preparing meals, housekeeping, doing laundry, using transportation, managing medications and managing finances) [Reports changes in hearing] : Reports changes in hearing [Smoke Detector] : smoke detector [Carbon Monoxide Detector] : carbon monoxide detector [Safety elements used in home] : safety elements used in home [Seat Belt] :  uses seat belt [With Patient/Caregiver] : , with patient/caregiver [Designated Healthcare Proxy] : Designated healthcare proxy [Name: ___] : Health Care Proxy's Name: [unfilled]  [Relationship: ___] : Relationship: [unfilled] [Aggressive treatment] : aggressive treatment [I will adhere to the patient's wishes.] : I will adhere to the patient's wishes. [Time Spent: ___ minutes] : Time Spent: [unfilled] minutes [Never] : Never [de-identified] : no [de-identified] : no [Audit-CScore] : 1 [de-identified] : regular [de-identified] : no [[id number]] : 0 [EyeExamDate] : 2023 [Change in mental status noted] : No change in mental status noted [Language] : denies difficulty with language [Behavior] : denies difficulty with behavior [Learning/Retaining New Information] : denies difficulty learning/retaining new information [Handling Complex Tasks] : denies difficulty handling complex tasks [Reports changes in vision] : Reports no changes in vision [Reports changes in dental health] : Reports no changes in dental health [Guns at Home] : no guns at home [Sunscreen] : does not use sunscreen [MammogramDate] : 02/2024 [PapSmearDate] : 10/2023 [BoneDensityDate] : 10/03/2022 [BoneDensityComments] : Normal dexa test [ColonoscopyDate] : 11/27/2018 [ColonoscopyComments] : Nl, diverticulosis of large intestine, f/u in 5 years, but was seen by Dr. Glass in 2024 was told to repeat colonoscopy in 10 years [de-identified] : with  [de-identified] : decreae hearing [de-identified] : wear reading glasses [AdvancecareDate] : 05/02/2024 [FreeTextEntry4] : Sravan #[phone] [TextEntry] : Colonoscopy on 11/20/2018 - IH, diverticulosis, repeat in 5 years Mammogram done offside 02/2024 - NL as per patient       DEXA test on 10/03/2022 - Normal       Thyroid u/s on 04/18/23 - has mildly enlarged thyroid with a small R colloid cysts(no significant changes from last year u/s) Thyroid u/s on 04/23/2024 -  has mildly heterogeneous enlarged thyroid with a small  colloid cysts. No suspicious nodules identified.

## 2024-05-02 NOTE — HISTORY OF PRESENT ILLNESS
[de-identified] : Ms. ANIVAL ROMERO 89 year female with a PMH HTN, hyperlipidemia, thyroid nodule present to the office to a physical exam. Patient feels good, denies complaints at present time

## 2024-05-02 NOTE — REVIEW OF SYSTEMS
[TextEntry] : Constitutional: Denies fever, fatigue,  recent changes in the weight Head: Denies headache, dizziness Eyes: Denies diplopia, tearing or pain Ears: Denies earache, tinnitus, C/o decrease hearing  Nose: Denies nasal obstruction,  epistaxis Throat: Denies throat pain Neck: Denies stiffness, muscle tenderness Chest: Denies cough, SOB, wheezing, chest congestion CV: Denies chest pain, palpitation GI: Denies abdominal pain, constipation, heartburn Genitourinary: Denies dysuria, urinary urgency Musculoskeletal: Denies joint pain Neuro: Denies changes in mental status Psychiatric: Denies depressive symptoms, change in sleep habits, changes in thought content

## 2024-05-02 NOTE — PLAN
[FreeTextEntry1] : Ms. ANIVAL ROMERO 73 year female with a PMH HTN, hyperlipidemia, thyroid nodule present to the office for a physial exam Well adult exam is performed. Recommend to do a blood test today, further management will depend on the blood test results. EKG was done and reviewed NSR 63 bpm, no acutes st - t changes from previous EKG. Has a low voltage. Recommend to f/u with a cardiologist HTN is well controlled, recommend to continue labetalol, olmesartan/amlodipine/hctz Hypokalemia continue klorcon 10meq Hyperlipidemia continue lipitor 20mg Bring report mammogram, pap test Decrease hearing, for a lesion on the R soft palate f/u with an ENT Will repeat dexa test on 10/2024  RTC to f/u in 2 wks. Patient is verbalized understanding.

## 2024-05-06 ENCOUNTER — RX RENEWAL (OUTPATIENT)
Age: 74
End: 2024-05-06

## 2024-05-06 LAB
ALBUMIN SERPL ELPH-MCNC: 4.3 G/DL
ALP BLD-CCNC: 106 U/L
ALT SERPL-CCNC: 20 U/L
ANION GAP SERPL CALC-SCNC: 15 MMOL/L
APPEARANCE: CLEAR
AST SERPL-CCNC: 17 U/L
BILIRUB SERPL-MCNC: 0.5 MG/DL
BILIRUBIN URINE: NEGATIVE
BLOOD URINE: NEGATIVE
BUN SERPL-MCNC: 18 MG/DL
CALCIUM SERPL-MCNC: 9.8 MG/DL
CHLORIDE SERPL-SCNC: 99 MMOL/L
CHOLEST SERPL-MCNC: 155 MG/DL
CO2 SERPL-SCNC: 26 MMOL/L
COLOR: YELLOW
CREAT SERPL-MCNC: 0.79 MG/DL
EGFR: 79 ML/MIN/1.73M2
GLUCOSE QUALITATIVE U: NEGATIVE MG/DL
GLUCOSE SERPL-MCNC: 67 MG/DL
HCV AB SER QL: NONREACTIVE
HCV S/CO RATIO: 0.08 S/CO
HDLC SERPL-MCNC: 61 MG/DL
HIV1+2 AB SPEC QL IA.RAPID: NONREACTIVE
KETONES URINE: NEGATIVE MG/DL
LDLC SERPL CALC-MCNC: 68 MG/DL
LEUKOCYTE ESTERASE URINE: ABNORMAL
NITRITE URINE: NEGATIVE
NONHDLC SERPL-MCNC: 93 MG/DL
PH URINE: 7.5
POTASSIUM SERPL-SCNC: 3.8 MMOL/L
PROT SERPL-MCNC: 6.8 G/DL
PROTEIN URINE: NEGATIVE MG/DL
SODIUM SERPL-SCNC: 140 MMOL/L
SPECIFIC GRAVITY URINE: 1.01
TRIGL SERPL-MCNC: 146 MG/DL
TSH SERPL-ACNC: 1.47 UIU/ML
UROBILINOGEN URINE: 0.2 MG/DL

## 2024-05-06 RX ORDER — LABETALOL HYDROCHLORIDE 100 MG/1
100 TABLET, FILM COATED ORAL
Qty: 180 | Refills: 1 | Status: ACTIVE | COMMUNITY
Start: 2022-05-09 | End: 1900-01-01

## 2024-06-21 ENCOUNTER — APPOINTMENT (OUTPATIENT)
Dept: CARDIOLOGY | Facility: CLINIC | Age: 74
End: 2024-06-21
Payer: MEDICARE

## 2024-06-21 VITALS
DIASTOLIC BLOOD PRESSURE: 74 MMHG | SYSTOLIC BLOOD PRESSURE: 138 MMHG | WEIGHT: 188 LBS | OXYGEN SATURATION: 97 % | HEIGHT: 65 IN | HEART RATE: 71 BPM | BODY MASS INDEX: 31.32 KG/M2

## 2024-06-21 VITALS — DIASTOLIC BLOOD PRESSURE: 68 MMHG | SYSTOLIC BLOOD PRESSURE: 125 MMHG

## 2024-06-21 DIAGNOSIS — E78.5 HYPERLIPIDEMIA, UNSPECIFIED: ICD-10-CM

## 2024-06-21 DIAGNOSIS — I10 ESSENTIAL (PRIMARY) HYPERTENSION: ICD-10-CM

## 2024-06-21 DIAGNOSIS — I51.9 HEART DISEASE, UNSPECIFIED: ICD-10-CM

## 2024-06-21 DIAGNOSIS — R06.09 OTHER FORMS OF DYSPNEA: ICD-10-CM

## 2024-06-21 PROCEDURE — G2211 COMPLEX E/M VISIT ADD ON: CPT

## 2024-06-21 PROCEDURE — 99204 OFFICE O/P NEW MOD 45 MIN: CPT

## 2024-06-21 NOTE — DISCUSSION/SUMMARY
[FreeTextEntry1] : Mild DD, borderline pHTN, worsening ALCARAZ  Check TTE  HTN: BP better on recheck, continue meds  HLD: Lipids great, continue statin  Recommend weight loss, increase exercise to improve conditioning. Can consider stress testing after TTE is obtained  RV 6M

## 2024-06-21 NOTE — HISTORY OF PRESENT ILLNESS
[FreeTextEntry1] : 74F with HTN, HLD, mild DD for cardiac eval  Noted with low voltage on ECG, similar to prior ECG's Notes ALCARAZ, longstanding but worsening as of late Denies CP, palpitations, lightheadedness  Never smoker. Did office work many years ago. Mother had heart disease later in life  ECG: SR with first degree AVB, low voltage   Atorvastatin 20mg  Labetalol 100mg BID  Olmesartan 40mg-Amlodipine 10mg- HCTZ 25mg

## 2024-08-13 ENCOUNTER — NON-APPOINTMENT (OUTPATIENT)
Age: 74
End: 2024-08-13

## 2024-08-13 ENCOUNTER — APPOINTMENT (OUTPATIENT)
Dept: INTERNAL MEDICINE | Facility: CLINIC | Age: 74
End: 2024-08-13
Payer: MEDICARE

## 2024-08-13 PROCEDURE — 93306 TTE W/DOPPLER COMPLETE: CPT

## 2024-10-15 ENCOUNTER — NON-APPOINTMENT (OUTPATIENT)
Age: 74
End: 2024-10-15

## 2024-10-23 ENCOUNTER — RX RENEWAL (OUTPATIENT)
Age: 74
End: 2024-10-23

## 2024-11-01 ENCOUNTER — RX RENEWAL (OUTPATIENT)
Age: 74
End: 2024-11-01

## 2024-11-08 ENCOUNTER — RX RENEWAL (OUTPATIENT)
Age: 74
End: 2024-11-08

## 2024-11-16 ENCOUNTER — RX RENEWAL (OUTPATIENT)
Age: 74
End: 2024-11-16

## 2024-11-21 ENCOUNTER — APPOINTMENT (OUTPATIENT)
Dept: INTERNAL MEDICINE | Facility: CLINIC | Age: 74
End: 2024-11-21
Payer: MEDICARE

## 2024-11-21 VITALS
OXYGEN SATURATION: 96 % | HEART RATE: 72 BPM | WEIGHT: 184 LBS | SYSTOLIC BLOOD PRESSURE: 142 MMHG | HEIGHT: 65 IN | BODY MASS INDEX: 30.66 KG/M2 | DIASTOLIC BLOOD PRESSURE: 78 MMHG

## 2024-11-21 DIAGNOSIS — E55.9 VITAMIN D DEFICIENCY, UNSPECIFIED: ICD-10-CM

## 2024-11-21 DIAGNOSIS — I10 ESSENTIAL (PRIMARY) HYPERTENSION: ICD-10-CM

## 2024-11-21 DIAGNOSIS — E78.5 HYPERLIPIDEMIA, UNSPECIFIED: ICD-10-CM

## 2024-11-21 DIAGNOSIS — E04.1 NONTOXIC SINGLE THYROID NODULE: ICD-10-CM

## 2024-11-21 DIAGNOSIS — E87.6 HYPOKALEMIA: ICD-10-CM

## 2024-11-21 PROCEDURE — 90662 IIV NO PRSV INCREASED AG IM: CPT

## 2024-11-21 PROCEDURE — G2211 COMPLEX E/M VISIT ADD ON: CPT

## 2024-11-21 PROCEDURE — G0008: CPT

## 2024-11-21 PROCEDURE — 99214 OFFICE O/P EST MOD 30 MIN: CPT

## 2024-11-21 PROCEDURE — 36415 COLL VENOUS BLD VENIPUNCTURE: CPT

## 2024-11-22 LAB
ALBUMIN SERPL ELPH-MCNC: 4.7 G/DL
ALP BLD-CCNC: 113 U/L
ALT SERPL-CCNC: 22 U/L
ANION GAP SERPL CALC-SCNC: 15 MMOL/L
AST SERPL-CCNC: 17 U/L
BILIRUB DIRECT SERPL-MCNC: 0.2 MG/DL
BILIRUB INDIRECT SERPL-MCNC: 0.4 MG/DL
BILIRUB SERPL-MCNC: 0.6 MG/DL
BUN SERPL-MCNC: 18 MG/DL
CALCIUM SERPL-MCNC: 10.5 MG/DL
CHLORIDE SERPL-SCNC: 99 MMOL/L
CHOLEST SERPL-MCNC: 170 MG/DL
CO2 SERPL-SCNC: 27 MMOL/L
CREAT SERPL-MCNC: 0.79 MG/DL
EGFR: 79 ML/MIN/1.73M2
ESTIMATED AVERAGE GLUCOSE: 114 MG/DL
GLUCOSE SERPL-MCNC: 82 MG/DL
HBA1C MFR BLD HPLC: 5.6 %
HDLC SERPL-MCNC: 64 MG/DL
LDLC SERPL CALC-MCNC: 80 MG/DL
NONHDLC SERPL-MCNC: 106 MG/DL
POTASSIUM SERPL-SCNC: 4.1 MMOL/L
PROT SERPL-MCNC: 7.3 G/DL
SODIUM SERPL-SCNC: 142 MMOL/L
TRIGL SERPL-MCNC: 158 MG/DL
TSH SERPL-ACNC: 1.18 UIU/ML

## 2024-12-07 ENCOUNTER — NON-APPOINTMENT (OUTPATIENT)
Age: 74
End: 2024-12-07

## 2024-12-20 ENCOUNTER — APPOINTMENT (OUTPATIENT)
Dept: CARDIOLOGY | Facility: CLINIC | Age: 74
End: 2024-12-20
Payer: MEDICARE

## 2024-12-20 VITALS — SYSTOLIC BLOOD PRESSURE: 134 MMHG | DIASTOLIC BLOOD PRESSURE: 76 MMHG

## 2024-12-20 VITALS
DIASTOLIC BLOOD PRESSURE: 80 MMHG | HEIGHT: 65 IN | BODY MASS INDEX: 30.66 KG/M2 | WEIGHT: 184 LBS | SYSTOLIC BLOOD PRESSURE: 144 MMHG | HEART RATE: 75 BPM | OXYGEN SATURATION: 96 %

## 2024-12-20 DIAGNOSIS — I27.20 PULMONARY HYPERTENSION, UNSPECIFIED: ICD-10-CM

## 2024-12-20 DIAGNOSIS — E78.5 HYPERLIPIDEMIA, UNSPECIFIED: ICD-10-CM

## 2024-12-20 DIAGNOSIS — I10 ESSENTIAL (PRIMARY) HYPERTENSION: ICD-10-CM

## 2024-12-20 DIAGNOSIS — R06.09 OTHER FORMS OF DYSPNEA: ICD-10-CM

## 2024-12-20 PROCEDURE — G2211 COMPLEX E/M VISIT ADD ON: CPT

## 2024-12-20 PROCEDURE — 99214 OFFICE O/P EST MOD 30 MIN: CPT

## 2025-03-10 ENCOUNTER — NON-APPOINTMENT (OUTPATIENT)
Age: 75
End: 2025-03-10

## 2025-03-10 ENCOUNTER — APPOINTMENT (OUTPATIENT)
Dept: INTERNAL MEDICINE | Facility: CLINIC | Age: 75
End: 2025-03-10
Payer: MEDICARE

## 2025-03-10 VITALS
SYSTOLIC BLOOD PRESSURE: 138 MMHG | HEIGHT: 65 IN | HEART RATE: 62 BPM | WEIGHT: 184 LBS | BODY MASS INDEX: 30.66 KG/M2 | OXYGEN SATURATION: 95 % | DIASTOLIC BLOOD PRESSURE: 78 MMHG

## 2025-03-10 DIAGNOSIS — E55.9 VITAMIN D DEFICIENCY, UNSPECIFIED: ICD-10-CM

## 2025-03-10 DIAGNOSIS — E78.5 HYPERLIPIDEMIA, UNSPECIFIED: ICD-10-CM

## 2025-03-10 DIAGNOSIS — I10 ESSENTIAL (PRIMARY) HYPERTENSION: ICD-10-CM

## 2025-03-10 DIAGNOSIS — E04.1 NONTOXIC SINGLE THYROID NODULE: ICD-10-CM

## 2025-03-10 DIAGNOSIS — E87.6 HYPOKALEMIA: ICD-10-CM

## 2025-03-10 PROCEDURE — 99214 OFFICE O/P EST MOD 30 MIN: CPT

## 2025-03-10 PROCEDURE — 36415 COLL VENOUS BLD VENIPUNCTURE: CPT

## 2025-03-10 PROCEDURE — G2211 COMPLEX E/M VISIT ADD ON: CPT

## 2025-03-11 LAB
ALBUMIN SERPL ELPH-MCNC: 4.5 G/DL
ALP BLD-CCNC: 111 U/L
ALT SERPL-CCNC: 22 U/L
ANION GAP SERPL CALC-SCNC: 11 MMOL/L
AST SERPL-CCNC: 17 U/L
BILIRUB SERPL-MCNC: 0.6 MG/DL
BUN SERPL-MCNC: 17 MG/DL
CALCIUM SERPL-MCNC: 9.9 MG/DL
CHLORIDE SERPL-SCNC: 102 MMOL/L
CHOLEST SERPL-MCNC: 168 MG/DL
CO2 SERPL-SCNC: 28 MMOL/L
CREAT SERPL-MCNC: 0.79 MG/DL
EGFRCR SERPLBLD CKD-EPI 2021: 79 ML/MIN/1.73M2
ESTIMATED AVERAGE GLUCOSE: 108 MG/DL
GLUCOSE SERPL-MCNC: 99 MG/DL
HBA1C MFR BLD HPLC: 5.4 %
HCT VFR BLD CALC: 46.6 %
HDLC SERPL-MCNC: 66 MG/DL
HGB BLD-MCNC: 15 G/DL
LDLC SERPL CALC-MCNC: 79 MG/DL
MCHC RBC-ENTMCNC: 29.3 PG
MCHC RBC-ENTMCNC: 32.2 G/DL
MCV RBC AUTO: 91 FL
NONHDLC SERPL-MCNC: 102 MG/DL
PLATELET # BLD AUTO: 256 K/UL
POTASSIUM SERPL-SCNC: 4.5 MMOL/L
PROT SERPL-MCNC: 6.8 G/DL
RBC # BLD: 5.12 M/UL
RBC # FLD: 14.1 %
SODIUM SERPL-SCNC: 141 MMOL/L
TRIGL SERPL-MCNC: 132 MG/DL
TSH SERPL-ACNC: 1.34 UIU/ML
WBC # FLD AUTO: 7.08 K/UL

## 2025-03-12 ENCOUNTER — NON-APPOINTMENT (OUTPATIENT)
Age: 75
End: 2025-03-12

## 2025-03-25 ENCOUNTER — RX RENEWAL (OUTPATIENT)
Age: 75
End: 2025-03-25

## 2025-04-23 ENCOUNTER — RX RENEWAL (OUTPATIENT)
Age: 75
End: 2025-04-23

## 2025-05-12 ENCOUNTER — NON-APPOINTMENT (OUTPATIENT)
Age: 75
End: 2025-05-12

## 2025-05-12 DIAGNOSIS — E04.1 NONTOXIC SINGLE THYROID NODULE: ICD-10-CM

## 2025-06-09 ENCOUNTER — APPOINTMENT (OUTPATIENT)
Dept: INTERNAL MEDICINE | Facility: CLINIC | Age: 75
End: 2025-06-09
Payer: MEDICARE

## 2025-06-09 VITALS
HEIGHT: 65 IN | OXYGEN SATURATION: 97 % | SYSTOLIC BLOOD PRESSURE: 146 MMHG | HEART RATE: 68 BPM | BODY MASS INDEX: 30.82 KG/M2 | DIASTOLIC BLOOD PRESSURE: 75 MMHG | WEIGHT: 185 LBS

## 2025-06-09 PROCEDURE — 99214 OFFICE O/P EST MOD 30 MIN: CPT

## 2025-06-09 PROCEDURE — G2211 COMPLEX E/M VISIT ADD ON: CPT

## 2025-06-09 PROCEDURE — 36415 COLL VENOUS BLD VENIPUNCTURE: CPT

## 2025-06-10 ENCOUNTER — NON-APPOINTMENT (OUTPATIENT)
Age: 75
End: 2025-06-10

## 2025-06-10 LAB
ALBUMIN SERPL ELPH-MCNC: 4.4 G/DL
ALP BLD-CCNC: 109 U/L
ALT SERPL-CCNC: 19 U/L
ANION GAP SERPL CALC-SCNC: 13 MMOL/L
AST SERPL-CCNC: 21 U/L
BASOPHILS # BLD AUTO: 0.03 K/UL
BASOPHILS NFR BLD AUTO: 0.4 %
BILIRUB DIRECT SERPL-MCNC: 0.22 MG/DL
BILIRUB INDIRECT SERPL-MCNC: 0.4 MG/DL
BILIRUB SERPL-MCNC: 0.6 MG/DL
BUN SERPL-MCNC: 22 MG/DL
CALCIUM SERPL-MCNC: 9.5 MG/DL
CHLORIDE SERPL-SCNC: 101 MMOL/L
CHOLEST SERPL-MCNC: 153 MG/DL
CO2 SERPL-SCNC: 26 MMOL/L
CREAT SERPL-MCNC: 0.8 MG/DL
EGFRCR SERPLBLD CKD-EPI 2021: 78 ML/MIN/1.73M2
EOSINOPHIL # BLD AUTO: 0.31 K/UL
EOSINOPHIL NFR BLD AUTO: 4.2 %
ESTIMATED AVERAGE GLUCOSE: 103 MG/DL
GLUCOSE SERPL-MCNC: 97 MG/DL
HBA1C MFR BLD HPLC: 5.2 %
HCT VFR BLD CALC: 43.9 %
HDLC SERPL-MCNC: 64 MG/DL
HGB BLD-MCNC: 14.5 G/DL
IMM GRANULOCYTES NFR BLD AUTO: 0.1 %
LDLC SERPL-MCNC: 63 MG/DL
LYMPHOCYTES # BLD AUTO: 2.24 K/UL
LYMPHOCYTES NFR BLD AUTO: 30.3 %
MAN DIFF?: NORMAL
MCHC RBC-ENTMCNC: 29.8 PG
MCHC RBC-ENTMCNC: 33 G/DL
MCV RBC AUTO: 90.1 FL
MONOCYTES # BLD AUTO: 0.64 K/UL
MONOCYTES NFR BLD AUTO: 8.7 %
NEUTROPHILS # BLD AUTO: 4.16 K/UL
NEUTROPHILS NFR BLD AUTO: 56.3 %
NONHDLC SERPL-MCNC: 89 MG/DL
PLATELET # BLD AUTO: 240 K/UL
POTASSIUM SERPL-SCNC: 3.5 MMOL/L
PROT SERPL-MCNC: 6.4 G/DL
RBC # BLD: 4.87 M/UL
RBC # FLD: 13.4 %
SODIUM SERPL-SCNC: 140 MMOL/L
TRIGL SERPL-MCNC: 153 MG/DL
TSH SERPL-ACNC: 1.23 UIU/ML
WBC # FLD AUTO: 7.39 K/UL

## 2025-06-16 ENCOUNTER — RX RENEWAL (OUTPATIENT)
Age: 75
End: 2025-06-16

## 2025-06-23 ENCOUNTER — APPOINTMENT (OUTPATIENT)
Dept: CARDIOLOGY | Facility: CLINIC | Age: 75
End: 2025-06-23
Payer: MEDICARE

## 2025-06-23 VITALS
SYSTOLIC BLOOD PRESSURE: 142 MMHG | DIASTOLIC BLOOD PRESSURE: 86 MMHG | OXYGEN SATURATION: 96 % | HEART RATE: 68 BPM | HEIGHT: 65 IN | WEIGHT: 185 LBS | BODY MASS INDEX: 30.82 KG/M2

## 2025-06-23 PROCEDURE — 99214 OFFICE O/P EST MOD 30 MIN: CPT

## 2025-06-23 PROCEDURE — G2211 COMPLEX E/M VISIT ADD ON: CPT

## 2025-07-22 ENCOUNTER — NON-APPOINTMENT (OUTPATIENT)
Age: 75
End: 2025-07-22